# Patient Record
Sex: FEMALE | Race: WHITE | NOT HISPANIC OR LATINO | Employment: FULL TIME | URBAN - NONMETROPOLITAN AREA
[De-identification: names, ages, dates, MRNs, and addresses within clinical notes are randomized per-mention and may not be internally consistent; named-entity substitution may affect disease eponyms.]

---

## 2017-07-01 ENCOUNTER — HOSPITAL ENCOUNTER (EMERGENCY)
Facility: HOSPITAL | Age: 57
Discharge: HOME | End: 2017-07-01
Attending: EMERGENCY MEDICINE | Admitting: EMERGENCY MEDICINE

## 2017-07-01 ENCOUNTER — APPOINTMENT (OUTPATIENT)
Dept: CT IMAGING | Facility: HOSPITAL | Age: 57
End: 2017-07-01

## 2017-07-01 ENCOUNTER — APPOINTMENT (OUTPATIENT)
Dept: GENERAL RADIOLOGY | Facility: HOSPITAL | Age: 57
End: 2017-07-01

## 2017-07-01 VITALS
HEART RATE: 78 BPM | OXYGEN SATURATION: 100 % | DIASTOLIC BLOOD PRESSURE: 88 MMHG | HEIGHT: 61 IN | TEMPERATURE: 98.2 F | SYSTOLIC BLOOD PRESSURE: 152 MMHG | WEIGHT: 198 LBS | BODY MASS INDEX: 37.38 KG/M2 | RESPIRATION RATE: 16 BRPM

## 2017-07-01 DIAGNOSIS — R10.9 RIGHT FLANK PAIN: ICD-10-CM

## 2017-07-01 DIAGNOSIS — N39.0 ACUTE UTI (URINARY TRACT INFECTION): Primary | ICD-10-CM

## 2017-07-01 LAB
ALBUMIN SERPL-MCNC: 4.1 G/DL (ref 3.5–5)
ALBUMIN/GLOB SERPL: 1.3 G/DL (ref 1.5–2.5)
ALP SERPL-CCNC: 88 U/L (ref 35–104)
ALT SERPL W P-5'-P-CCNC: 14 U/L (ref 10–36)
AMYLASE SERPL-CCNC: 35 U/L (ref 28–100)
ANION GAP SERPL CALCULATED.3IONS-SCNC: 4.2 MMOL/L (ref 3.6–11.2)
AST SERPL-CCNC: 13 U/L (ref 10–30)
BACTERIA UR QL AUTO: ABNORMAL /HPF
BASOPHILS # BLD AUTO: 0.02 10*3/MM3 (ref 0–0.3)
BASOPHILS NFR BLD AUTO: 0.3 % (ref 0–2)
BILIRUB SERPL-MCNC: 0.4 MG/DL (ref 0.2–1.8)
BILIRUB UR QL STRIP: NEGATIVE
BUN BLD-MCNC: 16 MG/DL (ref 7–21)
BUN/CREAT SERPL: 21.3 (ref 7–25)
CALCIUM SPEC-SCNC: 9.6 MG/DL (ref 7.7–10)
CHLORIDE SERPL-SCNC: 104 MMOL/L (ref 99–112)
CLARITY UR: ABNORMAL
CO2 SERPL-SCNC: 28.8 MMOL/L (ref 24.3–31.9)
COLOR UR: YELLOW
CREAT BLD-MCNC: 0.75 MG/DL (ref 0.43–1.29)
CRP SERPL-MCNC: 1.07 MG/DL (ref 0–0.99)
D-LACTATE SERPL-SCNC: 1.6 MMOL/L (ref 0.5–2)
DEPRECATED RDW RBC AUTO: 39.3 FL (ref 37–54)
EOSINOPHIL # BLD AUTO: 0.16 10*3/MM3 (ref 0–0.7)
EOSINOPHIL NFR BLD AUTO: 2.3 % (ref 0–5)
ERYTHROCYTE [DISTWIDTH] IN BLOOD BY AUTOMATED COUNT: 14.1 % (ref 11.5–14.5)
ERYTHROCYTE [SEDIMENTATION RATE] IN BLOOD: 34 MM/HR (ref 0–30)
GFR SERPL CREATININE-BSD FRML MDRD: 80 ML/MIN/1.73
GLOBULIN UR ELPH-MCNC: 3.2 GM/DL
GLUCOSE BLD-MCNC: 254 MG/DL (ref 70–110)
GLUCOSE UR STRIP-MCNC: ABNORMAL MG/DL
HCT VFR BLD AUTO: 34.6 % (ref 37–47)
HGB BLD-MCNC: 11.5 G/DL (ref 12–16)
HGB UR QL STRIP.AUTO: NEGATIVE
HYALINE CASTS UR QL AUTO: ABNORMAL /LPF
IMM GRANULOCYTES # BLD: 0.01 10*3/MM3 (ref 0–0.03)
IMM GRANULOCYTES NFR BLD: 0.1 % (ref 0–0.5)
KETONES UR QL STRIP: NEGATIVE
LEUKOCYTE ESTERASE UR QL STRIP.AUTO: ABNORMAL
LIPASE SERPL-CCNC: 65 U/L (ref 13–60)
LYMPHOCYTES # BLD AUTO: 2.72 10*3/MM3 (ref 1–3)
LYMPHOCYTES NFR BLD AUTO: 39.8 % (ref 21–51)
MAGNESIUM SERPL-MCNC: 1.6 MG/DL (ref 1.7–2.6)
MCH RBC QN AUTO: 26.3 PG (ref 27–33)
MCHC RBC AUTO-ENTMCNC: 33.2 G/DL (ref 33–37)
MCV RBC AUTO: 79 FL (ref 80–94)
MONOCYTES # BLD AUTO: 0.65 10*3/MM3 (ref 0.1–0.9)
MONOCYTES NFR BLD AUTO: 9.5 % (ref 0–10)
NEUTROPHILS # BLD AUTO: 3.28 10*3/MM3 (ref 1.4–6.5)
NEUTROPHILS NFR BLD AUTO: 48 % (ref 30–70)
NITRITE UR QL STRIP: NEGATIVE
OSMOLALITY SERPL CALC.SUM OF ELEC: 283.6 MOSM/KG (ref 273–305)
PH UR STRIP.AUTO: <=5 [PH] (ref 5–8)
PHOSPHATE SERPL-MCNC: 3.7 MG/DL (ref 2.7–4.5)
PLATELET # BLD AUTO: 225 10*3/MM3 (ref 130–400)
PMV BLD AUTO: 8.6 FL (ref 6–10)
POTASSIUM BLD-SCNC: 4 MMOL/L (ref 3.5–5.3)
PROT SERPL-MCNC: 7.3 G/DL (ref 6–8)
PROT UR QL STRIP: NEGATIVE
RBC # BLD AUTO: 4.38 10*6/MM3 (ref 4.2–5.4)
RBC # UR: ABNORMAL /HPF
REF LAB TEST METHOD: ABNORMAL
SODIUM BLD-SCNC: 137 MMOL/L (ref 135–153)
SP GR UR STRIP: 1.02 (ref 1–1.03)
SQUAMOUS #/AREA URNS HPF: ABNORMAL /HPF
TROPONIN I SERPL-MCNC: <0.006 NG/ML
UROBILINOGEN UR QL STRIP: ABNORMAL
WBC NRBC COR # BLD: 6.84 10*3/MM3 (ref 4.5–12.5)
WBC UR QL AUTO: ABNORMAL /HPF

## 2017-07-01 PROCEDURE — 74176 CT ABD & PELVIS W/O CONTRAST: CPT | Performed by: RADIOLOGY

## 2017-07-01 PROCEDURE — 71010 HC CHEST PA OR AP: CPT

## 2017-07-01 PROCEDURE — 96375 TX/PRO/DX INJ NEW DRUG ADDON: CPT

## 2017-07-01 PROCEDURE — 85025 COMPLETE CBC W/AUTO DIFF WBC: CPT | Performed by: EMERGENCY MEDICINE

## 2017-07-01 PROCEDURE — 83690 ASSAY OF LIPASE: CPT | Performed by: EMERGENCY MEDICINE

## 2017-07-01 PROCEDURE — 87086 URINE CULTURE/COLONY COUNT: CPT | Performed by: EMERGENCY MEDICINE

## 2017-07-01 PROCEDURE — 82150 ASSAY OF AMYLASE: CPT | Performed by: EMERGENCY MEDICINE

## 2017-07-01 PROCEDURE — 86140 C-REACTIVE PROTEIN: CPT | Performed by: EMERGENCY MEDICINE

## 2017-07-01 PROCEDURE — 99283 EMERGENCY DEPT VISIT LOW MDM: CPT

## 2017-07-01 PROCEDURE — 74176 CT ABD & PELVIS W/O CONTRAST: CPT

## 2017-07-01 PROCEDURE — 85652 RBC SED RATE AUTOMATED: CPT | Performed by: EMERGENCY MEDICINE

## 2017-07-01 PROCEDURE — 84100 ASSAY OF PHOSPHORUS: CPT | Performed by: EMERGENCY MEDICINE

## 2017-07-01 PROCEDURE — 80053 COMPREHEN METABOLIC PANEL: CPT | Performed by: EMERGENCY MEDICINE

## 2017-07-01 PROCEDURE — 71010 XR CHEST 1 VW: CPT | Performed by: RADIOLOGY

## 2017-07-01 PROCEDURE — 83735 ASSAY OF MAGNESIUM: CPT | Performed by: EMERGENCY MEDICINE

## 2017-07-01 PROCEDURE — 25010000002 ONDANSETRON PER 1 MG: Performed by: EMERGENCY MEDICINE

## 2017-07-01 PROCEDURE — 93010 ELECTROCARDIOGRAM REPORT: CPT | Performed by: INTERNAL MEDICINE

## 2017-07-01 PROCEDURE — 96374 THER/PROPH/DIAG INJ IV PUSH: CPT

## 2017-07-01 PROCEDURE — 83605 ASSAY OF LACTIC ACID: CPT | Performed by: EMERGENCY MEDICINE

## 2017-07-01 PROCEDURE — 84484 ASSAY OF TROPONIN QUANT: CPT | Performed by: EMERGENCY MEDICINE

## 2017-07-01 PROCEDURE — 93005 ELECTROCARDIOGRAM TRACING: CPT | Performed by: EMERGENCY MEDICINE

## 2017-07-01 PROCEDURE — 81001 URINALYSIS AUTO W/SCOPE: CPT | Performed by: EMERGENCY MEDICINE

## 2017-07-01 PROCEDURE — 87040 BLOOD CULTURE FOR BACTERIA: CPT | Performed by: EMERGENCY MEDICINE

## 2017-07-01 PROCEDURE — 25010000002 KETOROLAC TROMETHAMINE PER 15 MG: Performed by: EMERGENCY MEDICINE

## 2017-07-01 RX ORDER — SODIUM CHLORIDE 0.9 % (FLUSH) 0.9 %
10 SYRINGE (ML) INJECTION AS NEEDED
Status: DISCONTINUED | OUTPATIENT
Start: 2017-07-01 | End: 2017-07-01 | Stop reason: HOSPADM

## 2017-07-01 RX ORDER — KETOROLAC TROMETHAMINE 30 MG/ML
30 INJECTION, SOLUTION INTRAMUSCULAR; INTRAVENOUS ONCE
Status: COMPLETED | OUTPATIENT
Start: 2017-07-01 | End: 2017-07-01

## 2017-07-01 RX ORDER — NITROFURANTOIN 25; 75 MG/1; MG/1
100 CAPSULE ORAL EVERY 12 HOURS SCHEDULED
Status: DISCONTINUED | OUTPATIENT
Start: 2017-07-01 | End: 2017-07-01 | Stop reason: HOSPADM

## 2017-07-01 RX ORDER — ONDANSETRON 2 MG/ML
4 INJECTION INTRAMUSCULAR; INTRAVENOUS ONCE
Status: COMPLETED | OUTPATIENT
Start: 2017-07-01 | End: 2017-07-01

## 2017-07-01 RX ORDER — NITROFURANTOIN 25; 75 MG/1; MG/1
100 CAPSULE ORAL 2 TIMES DAILY
Qty: 20 CAPSULE | Refills: 0 | OUTPATIENT
Start: 2017-07-01 | End: 2018-12-25

## 2017-07-01 RX ADMIN — KETOROLAC TROMETHAMINE 30 MG: 30 INJECTION, SOLUTION INTRAMUSCULAR at 08:23

## 2017-07-01 RX ADMIN — ONDANSETRON 4 MG: 2 INJECTION, SOLUTION INTRAMUSCULAR; INTRAVENOUS at 08:20

## 2017-07-01 RX ADMIN — NITROFURANTOIN MONOHYDRATE/MACROCRYSTALLINE 100 MG: 25; 75 CAPSULE ORAL at 10:49

## 2017-07-01 NOTE — DISCHARGE INSTRUCTIONS

## 2017-07-02 NOTE — ED PROVIDER NOTES
Subjective   Patient is a 56 y.o. female presenting with flank pain.   Flank Pain   Pain location:  R flank  Pain quality: aching and sharp    Pain radiates to:  R flank  Pain severity:  Mild  Onset quality:  Gradual  Timing:  Constant  Progression:  Worsening  Chronicity:  New  Context: not alcohol use, not awakening from sleep, not diet changes, not eating, not laxative use, not medication withdrawal, not previous surgeries, not recent illness, not recent sexual activity, not recent travel, not retching, not sick contacts, not suspicious food intake and not trauma    Relieved by:  Nothing  Worsened by:  Nothing  Ineffective treatments:  None tried  Associated symptoms: no chest pain, no chills, no cough, no diarrhea, no dysuria, no fatigue, no fever, no nausea, no shortness of breath, no sore throat and no vomiting    Risk factors: obesity        Review of Systems   Constitutional: Negative for activity change, appetite change, chills, diaphoresis, fatigue and fever.   HENT: Negative for congestion, ear pain and sore throat.    Eyes: Negative for redness.   Respiratory: Negative for cough, chest tightness, shortness of breath and wheezing.    Cardiovascular: Negative for chest pain, palpitations and leg swelling.   Gastrointestinal: Positive for abdominal pain. Negative for diarrhea, nausea and vomiting.   Genitourinary: Positive for flank pain. Negative for dysuria and urgency.   Musculoskeletal: Negative for arthralgias, back pain, myalgias and neck pain.   Skin: Negative for pallor, rash and wound.   Neurological: Negative for dizziness, speech difficulty, weakness and headaches.   Psychiatric/Behavioral: Negative for agitation, behavioral problems, confusion and decreased concentration.       Past Medical History:   Diagnosis Date   • Anemia    • Diabetes mellitus    • Fatigue    • Gallbladder disease    • High cholesterol    • Hypertension    • Migraine    • Sinusitis     CURRENTLY       Allergies   Allergen  Reactions   • Latex Rash       Past Surgical History:   Procedure Laterality Date   • ABDOMINAL SURGERY     •  SECTION      times 3   • CHOLECYSTECTOMY N/A 2016    Procedure: CHOLECYSTECTOMY LAPAROSCOPIC;  Surgeon: Anil Resendez MD;  Location: [facility] OR;  Service:    • COLONOSCOPY     • COLONOSCOPY N/A 9/15/2016    Procedure: COLONOSCOPY;  Surgeon: Anil Resendez MD;  Location: [facility] OR;  Service:    • TUBAL ABDOMINAL LIGATION         Family History   Problem Relation Age of Onset   • Heart disease Mother    • Cancer Father    • Cancer Sister    • Diabetes Sister    • Hypertension Sister    • Diabetes Brother    • Heart disease Brother    • Hypertension Brother    • Cancer Paternal Aunt    • Cancer Paternal Uncle    • Diabetes Maternal Grandmother    • Breast cancer Maternal Aunt        Social History     Social History   • Marital status:      Spouse name: N/A   • Number of children: N/A   • Years of education: N/A     Social History Main Topics   • Smoking status: Never Smoker   • Smokeless tobacco: None   • Alcohol use No   • Drug use: No   • Sexual activity: Not Asked     Other Topics Concern   • None     Social History Narrative           Objective   Physical Exam   Constitutional: She is oriented to person, place, and time. She appears well-developed and well-nourished.  Non-toxic appearance. No distress.   HENT:   Head: Normocephalic and atraumatic.   Right Ear: External ear normal.   Left Ear: External ear normal.   Nose: Nose normal.   Mouth/Throat: Oropharynx is clear and moist and mucous membranes are normal. No oropharyngeal exudate. No tonsillar exudate.   Eyes: Conjunctivae, EOM and lids are normal. Pupils are equal, round, and reactive to light.   Neck: Normal range of motion and full passive range of motion without pain. Neck supple. No thyromegaly present.   Cardiovascular: Normal rate, regular rhythm, S1 normal, S2 normal, normal heart sounds, intact distal pulses and normal  pulses.    Pulmonary/Chest: Effort normal and breath sounds normal. No tachypnea. No respiratory distress. She has no decreased breath sounds. She has no wheezes. She has no rales. She exhibits no tenderness.   Abdominal: Soft. Normal appearance and bowel sounds are normal. She exhibits no distension. There is tenderness. There is CVA tenderness. There is no rebound and no guarding.   Musculoskeletal: Normal range of motion. She exhibits no edema, tenderness or deformity.   Lymphadenopathy:     She has no cervical adenopathy.   Neurological: She is alert and oriented to person, place, and time. She has normal strength. No cranial nerve deficit or sensory deficit. GCS eye subscore is 4. GCS verbal subscore is 5. GCS motor subscore is 6.   Skin: Skin is warm, dry and intact. No rash noted. She is not diaphoretic. No erythema. No pallor.   Psychiatric: She has a normal mood and affect. Her speech is normal and behavior is normal. Judgment and thought content normal. Cognition and memory are normal.   Nursing note and vitals reviewed.      Procedures         ED Course  ED Course   Value Comment By Time   CT Abdomen Pelvis Stone Protocol IMPRESSION:  : Mild thickening of the distal esophageal wall. I would consider direct  visualization  Moderate to large volume stool in the colon  Other findings as above Bacilio De La Rosa MD 07/01 0941   XR Chest 1 View IMPRESSION:  No evidence of active or acute cardiopulmonary disease on today's chest  radiograph. Bacilio De La Rosa MD 07/01 2767   ECG 12 Lead Normal Sinus Rhythm.  No Acute Ischemia. Bacilio De La Rosa MD 07/02 1632                  MDM  Number of Diagnoses or Management Options  Acute UTI (urinary tract infection): new and requires workup  Right flank pain: new and requires workup     Amount and/or Complexity of Data Reviewed  Clinical lab tests: ordered and reviewed  Tests in the radiology section of CPT®: ordered and reviewed  Tests in the  medicine section of CPT®: ordered and reviewed  Independent visualization of images, tracings, or specimens: yes    Risk of Complications, Morbidity, and/or Mortality  Presenting problems: moderate  Diagnostic procedures: moderate  Management options: moderate    Patient Progress  Patient progress: stable      Final diagnoses:   Acute UTI (urinary tract infection)   Right flank pain            Bacilio De La Rosa MD  07/02/17 6503

## 2017-07-03 LAB — BACTERIA SPEC AEROBE CULT: NORMAL

## 2017-07-06 LAB
BACTERIA SPEC AEROBE CULT: NORMAL
BACTERIA SPEC AEROBE CULT: NORMAL

## 2018-05-24 ENCOUNTER — HOSPITAL ENCOUNTER (OUTPATIENT)
Dept: MAMMOGRAPHY | Facility: HOSPITAL | Age: 58
Discharge: HOME | End: 2018-05-24
Admitting: NURSE PRACTITIONER

## 2018-05-24 ENCOUNTER — HOSPITAL ENCOUNTER (OUTPATIENT)
Dept: BONE DENSITY | Facility: HOSPITAL | Age: 58
Discharge: HOME | End: 2018-05-24

## 2018-05-24 DIAGNOSIS — Z12.39 BREAST CANCER SCREENING: ICD-10-CM

## 2018-05-24 DIAGNOSIS — Z78.0 POST-MENOPAUSAL: ICD-10-CM

## 2018-05-24 PROCEDURE — 77067 SCR MAMMO BI INCL CAD: CPT

## 2018-05-24 PROCEDURE — 77063 BREAST TOMOSYNTHESIS BI: CPT

## 2018-05-24 PROCEDURE — 77067 SCR MAMMO BI INCL CAD: CPT | Performed by: RADIOLOGY

## 2018-05-24 PROCEDURE — 77080 DXA BONE DENSITY AXIAL: CPT

## 2018-05-24 PROCEDURE — 77063 BREAST TOMOSYNTHESIS BI: CPT | Performed by: RADIOLOGY

## 2018-05-24 PROCEDURE — 77080 DXA BONE DENSITY AXIAL: CPT | Performed by: RADIOLOGY

## 2018-12-25 ENCOUNTER — APPOINTMENT (OUTPATIENT)
Dept: GENERAL RADIOLOGY | Facility: HOSPITAL | Age: 58
End: 2018-12-25

## 2018-12-25 ENCOUNTER — APPOINTMENT (OUTPATIENT)
Dept: CT IMAGING | Facility: HOSPITAL | Age: 58
End: 2018-12-25

## 2018-12-25 ENCOUNTER — HOSPITAL ENCOUNTER (EMERGENCY)
Facility: HOSPITAL | Age: 58
Discharge: HOME | End: 2018-12-25
Attending: FAMILY MEDICINE | Admitting: FAMILY MEDICINE

## 2018-12-25 DIAGNOSIS — R10.9 ACUTE RIGHT FLANK PAIN: Primary | ICD-10-CM

## 2018-12-25 DIAGNOSIS — Z79.4 TYPE 2 DIABETES MELLITUS WITH HYPERGLYCEMIA, WITH LONG-TERM CURRENT USE OF INSULIN (HCC): ICD-10-CM

## 2018-12-25 DIAGNOSIS — E11.65 TYPE 2 DIABETES MELLITUS WITH HYPERGLYCEMIA, WITH LONG-TERM CURRENT USE OF INSULIN (HCC): ICD-10-CM

## 2018-12-25 LAB
ALBUMIN SERPL-MCNC: 4.3 G/DL (ref 3.5–5)
ALBUMIN/GLOB SERPL: 1.3 G/DL (ref 1.5–2.5)
ALP SERPL-CCNC: 80 U/L (ref 35–104)
ALT SERPL W P-5'-P-CCNC: 32 U/L (ref 10–36)
ANION GAP SERPL CALCULATED.3IONS-SCNC: 9 MMOL/L (ref 3.6–11.2)
AST SERPL-CCNC: 32 U/L (ref 10–30)
BASOPHILS # BLD AUTO: 0.01 10*3/MM3 (ref 0–0.3)
BASOPHILS NFR BLD AUTO: 0.2 % (ref 0–2)
BILIRUB SERPL-MCNC: 0.3 MG/DL (ref 0.2–1.8)
BILIRUB UR QL STRIP: NEGATIVE
BUN BLD-MCNC: 19 MG/DL (ref 7–21)
BUN/CREAT SERPL: 17.8 (ref 7–25)
CALCIUM SPEC-SCNC: 9.2 MG/DL (ref 7.7–10)
CHLORIDE SERPL-SCNC: 103 MMOL/L (ref 99–112)
CLARITY UR: CLEAR
CO2 SERPL-SCNC: 25 MMOL/L (ref 24.3–31.9)
COLOR UR: YELLOW
CREAT BLD-MCNC: 1.07 MG/DL (ref 0.43–1.29)
DEPRECATED RDW RBC AUTO: 38.8 FL (ref 37–54)
EOSINOPHIL # BLD AUTO: 0.09 10*3/MM3 (ref 0–0.7)
EOSINOPHIL NFR BLD AUTO: 1.7 % (ref 0–5)
ERYTHROCYTE [DISTWIDTH] IN BLOOD BY AUTOMATED COUNT: 13.8 % (ref 11.5–14.5)
GFR SERPL CREATININE-BSD FRML MDRD: 53 ML/MIN/1.73
GLOBULIN UR ELPH-MCNC: 3.2 GM/DL
GLUCOSE BLD-MCNC: 495 MG/DL (ref 70–110)
GLUCOSE UR STRIP-MCNC: ABNORMAL MG/DL
HCT VFR BLD AUTO: 33.8 % (ref 37–47)
HGB BLD-MCNC: 11.1 G/DL (ref 12–16)
HGB UR QL STRIP.AUTO: NEGATIVE
IMM GRANULOCYTES # BLD AUTO: 0.01 10*3/MM3 (ref 0–0.03)
IMM GRANULOCYTES NFR BLD AUTO: 0.2 % (ref 0–0.5)
KETONES UR QL STRIP: NEGATIVE
LEUKOCYTE ESTERASE UR QL STRIP.AUTO: NEGATIVE
LIPASE SERPL-CCNC: 106 U/L (ref 13–60)
LYMPHOCYTES # BLD AUTO: 1.91 10*3/MM3 (ref 1–3)
LYMPHOCYTES NFR BLD AUTO: 35 % (ref 21–51)
MCH RBC QN AUTO: 26.4 PG (ref 27–33)
MCHC RBC AUTO-ENTMCNC: 32.8 G/DL (ref 33–37)
MCV RBC AUTO: 80.3 FL (ref 80–94)
MONOCYTES # BLD AUTO: 0.41 10*3/MM3 (ref 0.1–0.9)
MONOCYTES NFR BLD AUTO: 7.5 % (ref 0–10)
NEUTROPHILS # BLD AUTO: 3.02 10*3/MM3 (ref 1.4–6.5)
NEUTROPHILS NFR BLD AUTO: 55.4 % (ref 30–70)
NITRITE UR QL STRIP: NEGATIVE
OSMOLALITY SERPL CALC.SUM OF ELEC: 298.1 MOSM/KG (ref 273–305)
PH UR STRIP.AUTO: 7.5 [PH] (ref 5–8)
PLATELET # BLD AUTO: 244 10*3/MM3 (ref 130–400)
PMV BLD AUTO: 8.4 FL (ref 6–10)
POTASSIUM BLD-SCNC: 4.1 MMOL/L (ref 3.5–5.3)
PROT SERPL-MCNC: 7.5 G/DL (ref 6–8)
PROT UR QL STRIP: NEGATIVE
RBC # BLD AUTO: 4.21 10*6/MM3 (ref 4.2–5.4)
SODIUM BLD-SCNC: 137 MMOL/L (ref 135–153)
SP GR UR STRIP: 1.03 (ref 1–1.03)
UROBILINOGEN UR QL STRIP: ABNORMAL
WBC NRBC COR # BLD: 5.45 10*3/MM3 (ref 4.5–12.5)

## 2018-12-25 PROCEDURE — 25010000002 KETOROLAC TROMETHAMINE PER 15 MG: Performed by: FAMILY MEDICINE

## 2018-12-25 PROCEDURE — 63710000001 INSULIN REGULAR HUMAN PER 5 UNITS: Performed by: FAMILY MEDICINE

## 2018-12-25 PROCEDURE — 71045 X-RAY EXAM CHEST 1 VIEW: CPT | Performed by: RADIOLOGY

## 2018-12-25 PROCEDURE — 99285 EMERGENCY DEPT VISIT HI MDM: CPT

## 2018-12-25 PROCEDURE — 96372 THER/PROPH/DIAG INJ SC/IM: CPT

## 2018-12-25 PROCEDURE — 80053 COMPREHEN METABOLIC PANEL: CPT | Performed by: FAMILY MEDICINE

## 2018-12-25 PROCEDURE — 83690 ASSAY OF LIPASE: CPT | Performed by: FAMILY MEDICINE

## 2018-12-25 PROCEDURE — 74176 CT ABD & PELVIS W/O CONTRAST: CPT | Performed by: RADIOLOGY

## 2018-12-25 PROCEDURE — 71045 X-RAY EXAM CHEST 1 VIEW: CPT

## 2018-12-25 PROCEDURE — 74176 CT ABD & PELVIS W/O CONTRAST: CPT

## 2018-12-25 PROCEDURE — 81003 URINALYSIS AUTO W/O SCOPE: CPT | Performed by: FAMILY MEDICINE

## 2018-12-25 PROCEDURE — 85025 COMPLETE CBC W/AUTO DIFF WBC: CPT | Performed by: FAMILY MEDICINE

## 2018-12-25 RX ORDER — KETOROLAC TROMETHAMINE 30 MG/ML
30 INJECTION, SOLUTION INTRAMUSCULAR; INTRAVENOUS ONCE
Status: COMPLETED | OUTPATIENT
Start: 2018-12-25 | End: 2018-12-25

## 2018-12-25 RX ORDER — ORPHENADRINE CITRATE 100 MG/1
100 TABLET, EXTENDED RELEASE ORAL 2 TIMES DAILY PRN
Qty: 20 TABLET | Refills: 0 | OUTPATIENT
Start: 2018-12-25 | End: 2019-07-22

## 2018-12-25 RX ORDER — KETOROLAC TROMETHAMINE 10 MG/1
10 TABLET, FILM COATED ORAL EVERY 6 HOURS PRN
Qty: 20 TABLET | Refills: 0 | OUTPATIENT
Start: 2018-12-25 | End: 2019-07-22

## 2018-12-25 RX ADMIN — KETOROLAC TROMETHAMINE 30 MG: 30 INJECTION, SOLUTION INTRAMUSCULAR; INTRAVENOUS at 22:32

## 2018-12-25 RX ADMIN — HUMAN INSULIN 8 UNITS: 100 INJECTION, SOLUTION SUBCUTANEOUS at 22:18

## 2018-12-26 VITALS
HEIGHT: 61 IN | RESPIRATION RATE: 18 BRPM | TEMPERATURE: 98 F | BODY MASS INDEX: 37.38 KG/M2 | HEART RATE: 80 BPM | WEIGHT: 198 LBS | DIASTOLIC BLOOD PRESSURE: 86 MMHG | OXYGEN SATURATION: 98 % | SYSTOLIC BLOOD PRESSURE: 130 MMHG

## 2019-07-22 ENCOUNTER — APPOINTMENT (OUTPATIENT)
Dept: CT IMAGING | Facility: HOSPITAL | Age: 59
End: 2019-07-22

## 2019-07-22 ENCOUNTER — HOSPITAL ENCOUNTER (EMERGENCY)
Facility: HOSPITAL | Age: 59
Discharge: HOME | End: 2019-07-22
Attending: EMERGENCY MEDICINE | Admitting: EMERGENCY MEDICINE

## 2019-07-22 VITALS
SYSTOLIC BLOOD PRESSURE: 114 MMHG | DIASTOLIC BLOOD PRESSURE: 86 MMHG | OXYGEN SATURATION: 96 % | BODY MASS INDEX: 30.82 KG/M2 | TEMPERATURE: 98.6 F | RESPIRATION RATE: 20 BRPM | HEART RATE: 82 BPM | HEIGHT: 65 IN | WEIGHT: 185 LBS

## 2019-07-22 DIAGNOSIS — R19.7 DIARRHEA, UNSPECIFIED TYPE: ICD-10-CM

## 2019-07-22 DIAGNOSIS — R10.10 PAIN OF UPPER ABDOMEN: Primary | ICD-10-CM

## 2019-07-22 DIAGNOSIS — E83.42 HYPOMAGNESEMIA: ICD-10-CM

## 2019-07-22 LAB
ALBUMIN SERPL-MCNC: 3.81 G/DL (ref 3.5–5.2)
ALBUMIN/GLOB SERPL: 1.2 G/DL
ALP SERPL-CCNC: 109 U/L (ref 39–117)
ALT SERPL W P-5'-P-CCNC: 11 U/L (ref 1–33)
AMYLASE SERPL-CCNC: 40 U/L (ref 28–100)
ANION GAP SERPL CALCULATED.3IONS-SCNC: 15.5 MMOL/L (ref 5–15)
AST SERPL-CCNC: 13 U/L (ref 1–32)
BASOPHILS # BLD AUTO: 0.01 10*3/MM3 (ref 0–0.2)
BASOPHILS NFR BLD AUTO: 0.2 % (ref 0–1.5)
BILIRUB SERPL-MCNC: 0.6 MG/DL (ref 0.2–1.2)
BILIRUB UR QL STRIP: NEGATIVE
BUN BLD-MCNC: 17 MG/DL (ref 6–20)
BUN/CREAT SERPL: 20 (ref 7–25)
CALCIUM SPEC-SCNC: 9.2 MG/DL (ref 8.6–10.5)
CHLORIDE SERPL-SCNC: 99 MMOL/L (ref 98–107)
CLARITY UR: CLEAR
CO2 SERPL-SCNC: 22.5 MMOL/L (ref 22–29)
COLOR UR: YELLOW
CREAT BLD-MCNC: 0.85 MG/DL (ref 0.57–1)
CRP SERPL-MCNC: 0.67 MG/DL (ref 0–0.5)
DEPRECATED RDW RBC AUTO: 39.6 FL (ref 37–54)
EOSINOPHIL # BLD AUTO: 0.1 10*3/MM3 (ref 0–0.4)
EOSINOPHIL NFR BLD AUTO: 1.5 % (ref 0.3–6.2)
ERYTHROCYTE [DISTWIDTH] IN BLOOD BY AUTOMATED COUNT: 14.2 % (ref 12.3–15.4)
GFR SERPL CREATININE-BSD FRML MDRD: 69 ML/MIN/1.73
GLOBULIN UR ELPH-MCNC: 3.2 GM/DL
GLUCOSE BLD-MCNC: 320 MG/DL (ref 65–99)
GLUCOSE UR STRIP-MCNC: ABNORMAL MG/DL
HCT VFR BLD AUTO: 37.9 % (ref 34–46.6)
HGB BLD-MCNC: 12.3 G/DL (ref 12–15.9)
HGB UR QL STRIP.AUTO: NEGATIVE
IMM GRANULOCYTES # BLD AUTO: 0.01 10*3/MM3 (ref 0–0.05)
IMM GRANULOCYTES NFR BLD AUTO: 0.2 % (ref 0–0.5)
KETONES UR QL STRIP: NEGATIVE
LEUKOCYTE ESTERASE UR QL STRIP.AUTO: NEGATIVE
LIPASE SERPL-CCNC: 83 U/L (ref 13–60)
LYMPHOCYTES # BLD AUTO: 1.8 10*3/MM3 (ref 0.7–3.1)
LYMPHOCYTES NFR BLD AUTO: 27.7 % (ref 19.6–45.3)
MAGNESIUM SERPL-MCNC: 1.2 MG/DL (ref 1.6–2.6)
MCH RBC QN AUTO: 25.6 PG (ref 26.6–33)
MCHC RBC AUTO-ENTMCNC: 32.5 G/DL (ref 31.5–35.7)
MCV RBC AUTO: 79 FL (ref 79–97)
MONOCYTES # BLD AUTO: 0.45 10*3/MM3 (ref 0.1–0.9)
MONOCYTES NFR BLD AUTO: 6.9 % (ref 5–12)
NEUTROPHILS # BLD AUTO: 4.13 10*3/MM3 (ref 1.7–7)
NEUTROPHILS NFR BLD AUTO: 63.5 % (ref 42.7–76)
NITRITE UR QL STRIP: NEGATIVE
PH UR STRIP.AUTO: <=5 [PH] (ref 5–8)
PLATELET # BLD AUTO: 216 10*3/MM3 (ref 140–450)
PMV BLD AUTO: 8.7 FL (ref 6–12)
POTASSIUM BLD-SCNC: 4 MMOL/L (ref 3.5–5.2)
PROT SERPL-MCNC: 7 G/DL (ref 6–8.5)
PROT UR QL STRIP: NEGATIVE
RBC # BLD AUTO: 4.8 10*6/MM3 (ref 3.77–5.28)
SODIUM BLD-SCNC: 137 MMOL/L (ref 136–145)
SP GR UR STRIP: 1.02 (ref 1–1.03)
TROPONIN T SERPL-MCNC: <0.01 NG/ML (ref 0–0.03)
UROBILINOGEN UR QL STRIP: ABNORMAL
WBC NRBC COR # BLD: 6.5 10*3/MM3 (ref 3.4–10.8)

## 2019-07-22 PROCEDURE — 96365 THER/PROPH/DIAG IV INF INIT: CPT

## 2019-07-22 PROCEDURE — 96366 THER/PROPH/DIAG IV INF ADDON: CPT

## 2019-07-22 PROCEDURE — 81003 URINALYSIS AUTO W/O SCOPE: CPT | Performed by: PHYSICIAN ASSISTANT

## 2019-07-22 PROCEDURE — 74177 CT ABD & PELVIS W/CONTRAST: CPT | Performed by: RADIOLOGY

## 2019-07-22 PROCEDURE — 0 IOVERSOL 74 % SOLUTION: Performed by: EMERGENCY MEDICINE

## 2019-07-22 PROCEDURE — 84484 ASSAY OF TROPONIN QUANT: CPT | Performed by: PHYSICIAN ASSISTANT

## 2019-07-22 PROCEDURE — 74177 CT ABD & PELVIS W/CONTRAST: CPT

## 2019-07-22 PROCEDURE — 25010000002 MAGNESIUM SULFATE 2 GM/50ML SOLUTION: Performed by: PHYSICIAN ASSISTANT

## 2019-07-22 PROCEDURE — 83690 ASSAY OF LIPASE: CPT | Performed by: PHYSICIAN ASSISTANT

## 2019-07-22 PROCEDURE — 82150 ASSAY OF AMYLASE: CPT | Performed by: PHYSICIAN ASSISTANT

## 2019-07-22 PROCEDURE — 93005 ELECTROCARDIOGRAM TRACING: CPT | Performed by: PHYSICIAN ASSISTANT

## 2019-07-22 PROCEDURE — 80053 COMPREHEN METABOLIC PANEL: CPT | Performed by: PHYSICIAN ASSISTANT

## 2019-07-22 PROCEDURE — 99284 EMERGENCY DEPT VISIT MOD MDM: CPT

## 2019-07-22 PROCEDURE — 85025 COMPLETE CBC W/AUTO DIFF WBC: CPT | Performed by: PHYSICIAN ASSISTANT

## 2019-07-22 PROCEDURE — 93010 ELECTROCARDIOGRAM REPORT: CPT | Performed by: INTERNAL MEDICINE

## 2019-07-22 PROCEDURE — 83735 ASSAY OF MAGNESIUM: CPT | Performed by: PHYSICIAN ASSISTANT

## 2019-07-22 PROCEDURE — 86140 C-REACTIVE PROTEIN: CPT | Performed by: PHYSICIAN ASSISTANT

## 2019-07-22 RX ORDER — MAGNESIUM SULFATE HEPTAHYDRATE 40 MG/ML
2 INJECTION, SOLUTION INTRAVENOUS ONCE
Status: COMPLETED | OUTPATIENT
Start: 2019-07-22 | End: 2019-07-22

## 2019-07-22 RX ORDER — SODIUM CHLORIDE 0.9 % (FLUSH) 0.9 %
10 SYRINGE (ML) INJECTION AS NEEDED
Status: DISCONTINUED | OUTPATIENT
Start: 2019-07-22 | End: 2019-07-22 | Stop reason: HOSPADM

## 2019-07-22 RX ORDER — PANTOPRAZOLE SODIUM 40 MG/1
40 TABLET, DELAYED RELEASE ORAL DAILY
Qty: 30 TABLET | Refills: 0 | Status: SHIPPED | OUTPATIENT
Start: 2019-07-22

## 2019-07-22 RX ORDER — ONDANSETRON 4 MG/1
4 TABLET, ORALLY DISINTEGRATING ORAL EVERY 6 HOURS PRN
Qty: 10 TABLET | Refills: 0 | Status: SHIPPED | OUTPATIENT
Start: 2019-07-22

## 2019-07-22 RX ADMIN — MAGNESIUM SULFATE IN WATER 2 G: 40 INJECTION, SOLUTION INTRAVENOUS at 13:05

## 2019-07-22 RX ADMIN — IOVERSOL 100 ML: 741 INJECTION INTRA-ARTERIAL; INTRAVENOUS at 13:50

## 2019-07-22 RX ADMIN — SODIUM CHLORIDE 1000 ML: 9 INJECTION, SOLUTION INTRAVENOUS at 11:42

## 2019-08-19 ENCOUNTER — HOSPITAL ENCOUNTER (OUTPATIENT)
Dept: MAMMOGRAPHY | Facility: HOSPITAL | Age: 59
Discharge: HOME | End: 2019-08-19
Admitting: NURSE PRACTITIONER

## 2019-08-19 DIAGNOSIS — Z12.39 SCREENING BREAST EXAMINATION: ICD-10-CM

## 2019-08-19 PROCEDURE — 77063 BREAST TOMOSYNTHESIS BI: CPT

## 2019-08-19 PROCEDURE — 77067 SCR MAMMO BI INCL CAD: CPT

## 2019-08-19 PROCEDURE — 77063 BREAST TOMOSYNTHESIS BI: CPT | Performed by: RADIOLOGY

## 2019-08-19 PROCEDURE — 77067 SCR MAMMO BI INCL CAD: CPT | Performed by: RADIOLOGY

## 2020-01-01 ENCOUNTER — HOSPITAL ENCOUNTER (EMERGENCY)
Facility: HOSPITAL | Age: 60
Discharge: HOME | End: 2020-01-01
Attending: FAMILY MEDICINE | Admitting: FAMILY MEDICINE

## 2020-01-01 ENCOUNTER — APPOINTMENT (OUTPATIENT)
Dept: GENERAL RADIOLOGY | Facility: HOSPITAL | Age: 60
End: 2020-01-01

## 2020-01-01 VITALS
BODY MASS INDEX: 35.68 KG/M2 | DIASTOLIC BLOOD PRESSURE: 79 MMHG | HEART RATE: 67 BPM | OXYGEN SATURATION: 98 % | RESPIRATION RATE: 18 BRPM | HEIGHT: 61 IN | WEIGHT: 189 LBS | TEMPERATURE: 98 F | SYSTOLIC BLOOD PRESSURE: 176 MMHG

## 2020-01-01 DIAGNOSIS — M62.838 MUSCLE SPASM: ICD-10-CM

## 2020-01-01 DIAGNOSIS — M79.2 RADICULAR PAIN IN LEFT ARM: Primary | ICD-10-CM

## 2020-01-01 PROCEDURE — 73030 X-RAY EXAM OF SHOULDER: CPT

## 2020-01-01 PROCEDURE — 25010000002 METHYLPREDNISOLONE PER 80 MG: Performed by: PHYSICIAN ASSISTANT

## 2020-01-01 PROCEDURE — 96372 THER/PROPH/DIAG INJ SC/IM: CPT

## 2020-01-01 PROCEDURE — 25010000002 ORPHENADRINE CITRATE PER 60 MG: Performed by: PHYSICIAN ASSISTANT

## 2020-01-01 PROCEDURE — 99282 EMERGENCY DEPT VISIT SF MDM: CPT

## 2020-01-01 RX ORDER — IBUPROFEN 800 MG/1
800 TABLET ORAL EVERY 6 HOURS PRN
Qty: 30 TABLET | Refills: 0 | Status: SHIPPED | OUTPATIENT
Start: 2020-01-01

## 2020-01-01 RX ORDER — ORPHENADRINE CITRATE 30 MG/ML
60 INJECTION INTRAMUSCULAR; INTRAVENOUS ONCE
Status: COMPLETED | OUTPATIENT
Start: 2020-01-01 | End: 2020-01-01

## 2020-01-01 RX ORDER — METAXALONE 800 MG/1
800 TABLET ORAL 3 TIMES DAILY PRN
Qty: 9 TABLET | Refills: 0 | Status: SHIPPED | OUTPATIENT
Start: 2020-01-01

## 2020-01-01 RX ORDER — METHYLPREDNISOLONE ACETATE 80 MG/ML
120 INJECTION, SUSPENSION INTRA-ARTICULAR; INTRALESIONAL; INTRAMUSCULAR; SOFT TISSUE ONCE
Status: COMPLETED | OUTPATIENT
Start: 2020-01-01 | End: 2020-01-01

## 2020-01-01 RX ADMIN — ORPHENADRINE CITRATE 60 MG: 30 INJECTION INTRAMUSCULAR; INTRAVENOUS at 11:10

## 2020-01-01 RX ADMIN — METHYLPREDNISOLONE ACETATE 120 MG: 80 INJECTION, SUSPENSION INTRA-ARTICULAR; INTRALESIONAL; INTRAMUSCULAR; SOFT TISSUE at 11:11

## 2020-07-24 ENCOUNTER — HOSPITAL ENCOUNTER (OUTPATIENT)
Dept: BONE DENSITY | Facility: HOSPITAL | Age: 60
Discharge: HOME | End: 2020-07-24
Admitting: NURSE PRACTITIONER

## 2020-07-24 DIAGNOSIS — Z78.0 POSTMENOPAUSAL STATUS: ICD-10-CM

## 2020-07-24 PROCEDURE — 77080 DXA BONE DENSITY AXIAL: CPT | Performed by: RADIOLOGY

## 2020-07-24 PROCEDURE — 77080 DXA BONE DENSITY AXIAL: CPT

## 2020-08-24 ENCOUNTER — OFFICE VISIT (OUTPATIENT)
Dept: CARDIOLOGY | Facility: CLINIC | Age: 60
End: 2020-08-24

## 2020-08-24 ENCOUNTER — APPOINTMENT (OUTPATIENT)
Dept: BONE DENSITY | Facility: HOSPITAL | Age: 60
End: 2020-08-24

## 2020-08-24 VITALS
BODY MASS INDEX: 35.72 KG/M2 | DIASTOLIC BLOOD PRESSURE: 69 MMHG | HEIGHT: 61 IN | RESPIRATION RATE: 16 BRPM | TEMPERATURE: 97.5 F | HEART RATE: 63 BPM | WEIGHT: 189.2 LBS | SYSTOLIC BLOOD PRESSURE: 116 MMHG

## 2020-08-24 DIAGNOSIS — I10 ESSENTIAL HYPERTENSION: ICD-10-CM

## 2020-08-24 DIAGNOSIS — R06.02 SHORTNESS OF BREATH: ICD-10-CM

## 2020-08-24 DIAGNOSIS — E78.2 MIXED HYPERLIPIDEMIA: ICD-10-CM

## 2020-08-24 DIAGNOSIS — E11.65 UNCONTROLLED TYPE 2 DIABETES MELLITUS WITH HYPERGLYCEMIA (HCC): ICD-10-CM

## 2020-08-24 DIAGNOSIS — R07.2 PRECORDIAL PAIN: Primary | ICD-10-CM

## 2020-08-24 PROCEDURE — 99203 OFFICE O/P NEW LOW 30 MIN: CPT | Performed by: SPECIALIST

## 2020-08-24 RX ORDER — LOSARTAN POTASSIUM 50 MG/1
100 TABLET ORAL DAILY
COMMUNITY

## 2020-08-24 RX ORDER — OMEPRAZOLE 20 MG/1
20 CAPSULE, DELAYED RELEASE ORAL DAILY
COMMUNITY

## 2020-08-24 RX ORDER — FENOFIBRATE 134 MG/1
134 CAPSULE ORAL
COMMUNITY

## 2020-08-24 NOTE — PROGRESS NOTES
Subjective     Santa Valdovinos is a 59 y.o. female who presents to day for No chief complaint on file..    CHIEF COMPLIANT  No chief complaint on file.      Active Problems:  Problem List Items Addressed This Visit     None          HPI  HPI    PRIOR MEDS  Current Outpatient Medications on File Prior to Visit   Medication Sig Dispense Refill   • atorvastatin (LIPITOR) 20 MG tablet Take 40 mg by mouth Daily.     • cholecalciferol (VITAMIN D3) 1.25 MG (55832 UT) capsule Take 50,000 Units by mouth 1 (One) Time Per Week.     • fenofibrate micronized (LOFIBRA) 134 MG capsule Take 134 mg by mouth Every Morning Before Breakfast.     • ibuprofen (ADVIL,MOTRIN) 800 MG tablet Take 1 tablet by mouth Every 6 (Six) Hours As Needed for Moderate Pain . 30 tablet 0   • Insulin Glargine (BASAGLAR KWIKPEN SC) Inject 30 Units under the skin into the appropriate area as directed Every Night.     • losartan (COZAAR) 50 MG tablet Take 50 mg by mouth Daily.     • metFORMIN XR (GLUCOPHAGE-XR) 500 MG 24 hr tablet Take 1,000 mg by mouth 2 (two) times a day.     • omeprazole (priLOSEC) 20 MG capsule Take 20 mg by mouth Daily.     • docusate calcium (SURFAK) 240 MG capsule Take 1 capsule by mouth 2 (two) times a day. 60 capsule 0   • glipiZIDE (GLUCOTROL) 5 MG tablet Take 5 mg by mouth 2 (two) times a day before meals.     • lisinopril (PRINIVIL,ZESTRIL) 10 MG tablet Take 10 mg by mouth daily.     • metaxalone (SKELAXIN) 800 MG tablet Take 1 tablet by mouth 3 (Three) Times a Day As Needed for Muscle Spasms. 9 tablet 0   • Omega-3 Fatty Acids (FISH OIL) 1000 MG capsule capsule Take 1,000 mg by mouth daily with breakfast.     • ondansetron ODT (ZOFRAN-ODT) 4 MG disintegrating tablet Take 1 tablet by mouth Every 6 (Six) Hours As Needed for Nausea or Vomiting. 10 tablet 0   • ONE TOUCH ULTRA TEST test strip      • ONETOUCH DELICA LANCETS 33G misc      • pantoprazole (PROTONIX) 40 MG EC tablet Take 1 tablet by mouth Daily. 30 tablet 0   • SUPREP  BOWEL PREP solution oral solution Dispense one Kit        Sig: Used as Directed 2 bottle 0     No current facility-administered medications on file prior to visit.        ALLERGIES  Latex    HISTORY  Past Medical History:   Diagnosis Date   • Anemia    • Diabetes mellitus (CMS/HCC)    • Fatigue    • Gallbladder disease    • High cholesterol    • Hypertension    • Migraine    • Sinusitis     CURRENTLY       Social History     Socioeconomic History   • Marital status:      Spouse name: Not on file   • Number of children: Not on file   • Years of education: Not on file   • Highest education level: Not on file   Tobacco Use   • Smoking status: Never Smoker   • Smokeless tobacco: Never Used   Substance and Sexual Activity   • Alcohol use: No   • Drug use: No   • Sexual activity: Defer     Birth control/protection: Surgical       Family History   Problem Relation Age of Onset   • Heart disease Mother    • Cancer Father    • Cancer Sister    • Diabetes Sister    • Hypertension Sister    • Diabetes Brother    • Heart disease Brother    • Hypertension Brother    • Cancer Paternal Aunt    • Cancer Paternal Uncle    • Diabetes Maternal Grandmother    • Breast cancer Maternal Aunt        Review of Systems   Respiratory: Positive for shortness of breath.    Cardiovascular: Positive for leg swelling.   Musculoskeletal: Positive for arthralgias.   Neurological: Positive for numbness and headaches.   Hematological: Bruises/bleeds easily.       Objective     VITALS: There were no vitals taken for this visit.    LABS:   Lab Results (most recent)     None          IMAGING:   Dexa Bone Density Axial    Result Date: 7/24/2020  1. According to the World Health Organization definitions of osteoporosis based on bone density, this patient's bone mineral density is compatible with  osteopenia and the fracture risk is moderate. 2. Osteopenia in the right femoral neck.  This report was finalized on 7/24/2020 9:10 AM by Dr. Julio Snyder,  "MD.        EXAM:  Physical Exam    Procedure     ECG 12 Lead  Date/Time: 8/24/2020 1:36 PM  Performed by: Conrad Choi MD  Authorized by: Conrad Choi MD                  Assessment/Plan   No diagnosis found.    No follow-ups on file.    There are no diagnoses linked to this encounter.    Santa Valdovinos  reports that she has never smoked. She has never used smokeless tobacco.. I have educated her on the risk of diseases from using tobacco products such as {Tobacco Cessation Diseases:53343::\"cancer\",\"COPD\",\"heart diease\"}.     I advised her to quit and she is {Willing/Not Willing to Quit Tobacco Products:98049}.    I spent {Time Spent Tobacco :85997} minutes counseling the patient.           Patient's There is no height or weight on file to calculate BMI. BMI is {BMI range:41518}.           MEDS ORDERED DURING VISIT:  No orders of the defined types were placed in this encounter.        This document has been electronically signed by Conrad Choi MD  August 24, 2020 13:34      "

## 2020-09-24 ENCOUNTER — HOSPITAL ENCOUNTER (OUTPATIENT)
Dept: CARDIOLOGY | Facility: HOSPITAL | Age: 60
Discharge: HOME | End: 2020-09-24

## 2020-09-24 ENCOUNTER — HOSPITAL ENCOUNTER (OUTPATIENT)
Dept: NUCLEAR MEDICINE | Facility: HOSPITAL | Age: 60
Discharge: HOME | End: 2020-09-24

## 2020-09-24 DIAGNOSIS — R07.2 PRECORDIAL PAIN: ICD-10-CM

## 2020-09-24 DIAGNOSIS — R06.02 SHORTNESS OF BREATH: ICD-10-CM

## 2020-09-24 LAB
BH CV ECHO MEAS - % IVS THICK: 12.4 %
BH CV ECHO MEAS - % LVPW THICK: 4.3 %
BH CV ECHO MEAS - ACS: 2 CM
BH CV ECHO MEAS - AO MAX PG: 13.7 MMHG
BH CV ECHO MEAS - AO MEAN PG: 7 MMHG
BH CV ECHO MEAS - AO ROOT AREA (BSA CORRECTED): 1.3
BH CV ECHO MEAS - AO ROOT AREA: 4.7 CM^2
BH CV ECHO MEAS - AO ROOT DIAM: 2.5 CM
BH CV ECHO MEAS - AO V2 MAX: 185 CM/SEC
BH CV ECHO MEAS - AO V2 MEAN: 119 CM/SEC
BH CV ECHO MEAS - AO V2 VTI: 45.6 CM
BH CV ECHO MEAS - BSA(HAYCOCK): 2 M^2
BH CV ECHO MEAS - BSA: 1.8 M^2
BH CV ECHO MEAS - BZI_BMI: 35.7 KILOGRAMS/M^2
BH CV ECHO MEAS - BZI_METRIC_HEIGHT: 154.9 CM
BH CV ECHO MEAS - BZI_METRIC_WEIGHT: 85.7 KG
BH CV ECHO MEAS - EDV(CUBED): 136.2 ML
BH CV ECHO MEAS - EDV(MOD-SP4): 59.6 ML
BH CV ECHO MEAS - EDV(TEICH): 126.4 ML
BH CV ECHO MEAS - EF(CUBED): 49.8 %
BH CV ECHO MEAS - EF(MOD-SP4): 60.2 %
BH CV ECHO MEAS - EF(TEICH): 41.6 %
BH CV ECHO MEAS - ESV(CUBED): 68.4 ML
BH CV ECHO MEAS - ESV(MOD-SP4): 23.7 ML
BH CV ECHO MEAS - ESV(TEICH): 73.8 ML
BH CV ECHO MEAS - FS: 20.5 %
BH CV ECHO MEAS - IVS/LVPW: 1.1
BH CV ECHO MEAS - IVSD: 1.1 CM
BH CV ECHO MEAS - IVSS: 1.3 CM
BH CV ECHO MEAS - LA DIMENSION: 3.3 CM
BH CV ECHO MEAS - LA/AO: 1.3
BH CV ECHO MEAS - LV DIASTOLIC VOL/BSA (35-75): 32.3 ML/M^2
BH CV ECHO MEAS - LV MASS(C)D: 216.2 GRAMS
BH CV ECHO MEAS - LV MASS(C)DI: 117.2 GRAMS/M^2
BH CV ECHO MEAS - LV MASS(C)S: 169 GRAMS
BH CV ECHO MEAS - LV MASS(C)SI: 91.6 GRAMS/M^2
BH CV ECHO MEAS - LV SYSTOLIC VOL/BSA (12-30): 12.9 ML/M^2
BH CV ECHO MEAS - LVIDD: 5.1 CM
BH CV ECHO MEAS - LVIDS: 4.1 CM
BH CV ECHO MEAS - LVLD AP4: 6.4 CM
BH CV ECHO MEAS - LVLS AP4: 6.5 CM
BH CV ECHO MEAS - LVOT AREA (M): 3.1 CM^2
BH CV ECHO MEAS - LVOT AREA: 3.1 CM^2
BH CV ECHO MEAS - LVOT DIAM: 2 CM
BH CV ECHO MEAS - LVPWD: 1.1 CM
BH CV ECHO MEAS - LVPWS: 1.1 CM
BH CV ECHO MEAS - MV A MAX VEL: 106 CM/SEC
BH CV ECHO MEAS - MV E MAX VEL: 125 CM/SEC
BH CV ECHO MEAS - MV E/A: 1.2
BH CV ECHO MEAS - PA ACC TIME: 0.07 SEC
BH CV ECHO MEAS - PA PR(ACCEL): 47.5 MMHG
BH CV ECHO MEAS - RAP SYSTOLE: 10 MMHG
BH CV ECHO MEAS - RVSP: 31.5 MMHG
BH CV ECHO MEAS - SI(AO): 116.6 ML/M^2
BH CV ECHO MEAS - SI(CUBED): 36.7 ML/M^2
BH CV ECHO MEAS - SI(MOD-SP4): 19.5 ML/M^2
BH CV ECHO MEAS - SI(TEICH): 28.5 ML/M^2
BH CV ECHO MEAS - SV(AO): 215 ML
BH CV ECHO MEAS - SV(CUBED): 67.8 ML
BH CV ECHO MEAS - SV(MOD-SP4): 35.9 ML
BH CV ECHO MEAS - SV(TEICH): 52.6 ML
BH CV ECHO MEAS - TR MAX VEL: 232 CM/SEC
BH CV STRESS BP STAGE 1: NORMAL
BH CV STRESS DURATION MIN STAGE 1: 2
BH CV STRESS DURATION SEC STAGE 1: 11
BH CV STRESS GRADE STAGE 1: 10
BH CV STRESS HR STAGE 1: 144
BH CV STRESS METS STAGE 1: 5
BH CV STRESS PROTOCOL 1: NORMAL
BH CV STRESS RECOVERY BP: NORMAL MMHG
BH CV STRESS RECOVERY HR: 81 BPM
BH CV STRESS SPEED STAGE 1: 1.7
BH CV STRESS STAGE 1: 1
LV EF NUC BP: 56 %
MAXIMAL PREDICTED HEART RATE: 162 BPM
MAXIMAL PREDICTED HEART RATE: 162 BPM
PERCENT MAX PREDICTED HR: 88.89 %
STRESS BASELINE BP: NORMAL MMHG
STRESS BASELINE HR: 82 BPM
STRESS PERCENT HR: 105 %
STRESS POST ESTIMATED WORKLOAD: 4.6 METS
STRESS POST EXERCISE DUR MIN: 2 MIN
STRESS POST EXERCISE DUR SEC: 11 SEC
STRESS POST PEAK BP: NORMAL MMHG
STRESS POST PEAK HR: 144 BPM
STRESS TARGET HR: 138 BPM
STRESS TARGET HR: 138 BPM

## 2020-09-24 PROCEDURE — A9500 TC99M SESTAMIBI: HCPCS | Performed by: SPECIALIST

## 2020-09-24 PROCEDURE — 0 TECHNETIUM SESTAMIBI: Performed by: SPECIALIST

## 2020-09-24 PROCEDURE — 93018 CV STRESS TEST I&R ONLY: CPT | Performed by: SPECIALIST

## 2020-09-24 PROCEDURE — 78452 HT MUSCLE IMAGE SPECT MULT: CPT | Performed by: SPECIALIST

## 2020-09-24 PROCEDURE — 93306 TTE W/DOPPLER COMPLETE: CPT

## 2020-09-24 PROCEDURE — 78452 HT MUSCLE IMAGE SPECT MULT: CPT

## 2020-09-24 PROCEDURE — 93306 TTE W/DOPPLER COMPLETE: CPT | Performed by: SPECIALIST

## 2020-09-24 PROCEDURE — 93017 CV STRESS TEST TRACING ONLY: CPT

## 2020-09-24 RX ADMIN — TECHNETIUM TC 99M SESTAMIBI 1 DOSE: 1 INJECTION INTRAVENOUS at 09:36

## 2020-09-24 RX ADMIN — TECHNETIUM TC 99M SESTAMIBI 1 DOSE: 1 INJECTION INTRAVENOUS at 08:22

## 2020-10-07 ENCOUNTER — HOSPITAL ENCOUNTER (OUTPATIENT)
Dept: MAMMOGRAPHY | Facility: HOSPITAL | Age: 60
Discharge: HOME | End: 2020-10-07
Admitting: NURSE PRACTITIONER

## 2020-10-07 DIAGNOSIS — Z12.31 VISIT FOR SCREENING MAMMOGRAM: ICD-10-CM

## 2020-10-07 PROCEDURE — 77063 BREAST TOMOSYNTHESIS BI: CPT

## 2020-10-07 PROCEDURE — 77067 SCR MAMMO BI INCL CAD: CPT

## 2020-10-08 PROCEDURE — 77067 SCR MAMMO BI INCL CAD: CPT | Performed by: RADIOLOGY

## 2020-10-08 PROCEDURE — 77063 BREAST TOMOSYNTHESIS BI: CPT | Performed by: RADIOLOGY

## 2020-10-20 ENCOUNTER — OFFICE VISIT (OUTPATIENT)
Dept: CARDIOLOGY | Facility: CLINIC | Age: 60
End: 2020-10-20

## 2020-10-20 VITALS
WEIGHT: 188.8 LBS | BODY MASS INDEX: 35.65 KG/M2 | RESPIRATION RATE: 16 BRPM | DIASTOLIC BLOOD PRESSURE: 62 MMHG | HEIGHT: 61 IN | HEART RATE: 70 BPM | SYSTOLIC BLOOD PRESSURE: 102 MMHG | TEMPERATURE: 98.6 F

## 2020-10-20 DIAGNOSIS — R07.2 PRECORDIAL PAIN: ICD-10-CM

## 2020-10-20 DIAGNOSIS — E78.2 MIXED HYPERLIPIDEMIA: ICD-10-CM

## 2020-10-20 DIAGNOSIS — R06.02 SHORTNESS OF BREATH: ICD-10-CM

## 2020-10-20 DIAGNOSIS — I10 ESSENTIAL HYPERTENSION: Primary | ICD-10-CM

## 2020-10-20 PROCEDURE — 99213 OFFICE O/P EST LOW 20 MIN: CPT | Performed by: SPECIALIST

## 2020-10-20 RX ORDER — CETIRIZINE HYDROCHLORIDE 10 MG/1
10 TABLET ORAL DAILY
COMMUNITY

## 2020-10-20 RX ORDER — ASPIRIN 81 MG/1
81 TABLET ORAL DAILY
COMMUNITY

## 2020-10-20 RX ORDER — FERROUS SULFATE 325(65) MG
325 TABLET ORAL 3 TIMES WEEKLY
COMMUNITY

## 2020-10-20 RX ORDER — METHOCARBAMOL 500 MG/1
500 TABLET, FILM COATED ORAL DAILY
COMMUNITY

## 2020-10-20 NOTE — PROGRESS NOTES
"Subjective   Follow up, stress test result  Santa Valdovinos is a 59 y.o. female who presents to day for Results (stress and echo findings), Shortness of Breath (\"little\"), Edema (LE), and Med Management (list provided).    CHIEF COMPLIANT  Chief Complaint   Patient presents with   • Results     stress and echo findings   • Shortness of Breath     \"little\"   • Edema     LE   • Med Management     list provided       Active Problems:  Problem List Items Addressed This Visit        Cardiovascular and Mediastinum    Essential hypertension - Primary    Mixed hyperlipidemia       Respiratory    Shortness of breath       Nervous and Auditory    Precordial pain          HPI  HPI  She feels a lot better, with no chest pain, but with stable shortness of breath on moderate exertion, and with intermittent edema, she denies palpitations  PRIOR MEDS  Current Outpatient Medications on File Prior to Visit   Medication Sig Dispense Refill   • aspirin 81 MG EC tablet Take 81 mg by mouth Daily.     • atorvastatin (LIPITOR) 20 MG tablet Take 40 mg by mouth Daily.     • cetirizine (zyrTEC) 10 MG tablet Take 10 mg by mouth Daily.     • cholecalciferol (VITAMIN D3) 1.25 MG (88380 UT) capsule Take 50,000 Units by mouth 1 (One) Time Per Week.     • fenofibrate micronized (LOFIBRA) 134 MG capsule Take 134 mg by mouth Every Morning Before Breakfast.     • ferrous sulfate 325 (65 FE) MG tablet Take 325 mg by mouth 3 (Three) Times a Week.     • ibuprofen (ADVIL,MOTRIN) 800 MG tablet Take 1 tablet by mouth Every 6 (Six) Hours As Needed for Moderate Pain . 30 tablet 0   • Insulin Glargine (BASAGLAR KWIKPEN SC) Inject 30 Units under the skin into the appropriate area as directed Every Night.     • losartan (COZAAR) 50 MG tablet Take 100 mg by mouth Daily.     • metFORMIN XR (GLUCOPHAGE-XR) 500 MG 24 hr tablet Take 1,000 mg by mouth 2 (two) times a day.     • methocarbamol (ROBAXIN) 500 MG tablet Take 500 mg by mouth Daily.     • omeprazole " (priLOSEC) 20 MG capsule Take 20 mg by mouth Daily.     • docusate calcium (SURFAK) 240 MG capsule Take 1 capsule by mouth 2 (two) times a day. 60 capsule 0   • glipiZIDE (GLUCOTROL) 5 MG tablet Take 5 mg by mouth 2 (two) times a day before meals.     • lisinopril (PRINIVIL,ZESTRIL) 10 MG tablet Take 10 mg by mouth daily.     • metaxalone (SKELAXIN) 800 MG tablet Take 1 tablet by mouth 3 (Three) Times a Day As Needed for Muscle Spasms. 9 tablet 0   • Omega-3 Fatty Acids (FISH OIL) 1000 MG capsule capsule Take 1,000 mg by mouth daily with breakfast.     • ondansetron ODT (ZOFRAN-ODT) 4 MG disintegrating tablet Take 1 tablet by mouth Every 6 (Six) Hours As Needed for Nausea or Vomiting. 10 tablet 0   • ONE TOUCH ULTRA TEST test strip      • ONETOUCH DELICA LANCETS 33G misc      • pantoprazole (PROTONIX) 40 MG EC tablet Take 1 tablet by mouth Daily. 30 tablet 0   • SUPREP BOWEL PREP solution oral solution Dispense one Kit        Sig: Used as Directed 2 bottle 0     No current facility-administered medications on file prior to visit.        ALLERGIES  Latex    HISTORY  Past Medical History:   Diagnosis Date   • Anemia    • Diabetes mellitus (CMS/HCC)    • Fatigue    • Gallbladder disease    • High cholesterol    • Hypertension    • Migraine    • Sinusitis     CURRENTLY       Social History     Socioeconomic History   • Marital status:      Spouse name: Not on file   • Number of children: Not on file   • Years of education: Not on file   • Highest education level: Not on file   Tobacco Use   • Smoking status: Never Smoker   • Smokeless tobacco: Never Used   Substance and Sexual Activity   • Alcohol use: No   • Drug use: No   • Sexual activity: Defer     Birth control/protection: Surgical       Family History   Problem Relation Age of Onset   • Heart disease Mother    • Cancer Father    • Cancer Sister    • Diabetes Sister    • Hypertension Sister    • Diabetes Brother    • Heart disease Brother    • Hypertension  "Brother    • Cancer Paternal Aunt    • Cancer Paternal Uncle    • Diabetes Maternal Grandmother    • Breast cancer Maternal Aunt        Review of Systems   Constitutional: Negative for activity change and appetite change.   HENT: Negative for congestion.    Eyes: Negative for discharge and itching.   Respiratory: Positive for chest tightness and shortness of breath. Negative for apnea, cough, choking and stridor.    Cardiovascular: Positive for leg swelling. Negative for chest pain and palpitations.   Gastrointestinal: Negative for abdominal distention and abdominal pain.   Endocrine: Negative for cold intolerance and heat intolerance.   Genitourinary: Negative for flank pain.   Musculoskeletal: Negative for neck stiffness.   Skin: Negative for color change and pallor.   Allergic/Immunologic: Negative for immunocompromised state.   Neurological: Positive for dizziness.   Hematological: Does not bruise/bleed easily.   Psychiatric/Behavioral: Negative for agitation and behavioral problems.       Objective     VITALS: /62   Pulse 70   Temp 98.6 °F (37 °C)   Resp 16   Ht 154.9 cm (61\")   Wt 85.6 kg (188 lb 12.8 oz)   BMI 35.67 kg/m²     LABS:   Lab Results (most recent)     None          IMAGING:   Dexa Bone Density Axial    Result Date: 7/24/2020  1. According to the World Health Organization definitions of osteoporosis based on bone density, this patient's bone mineral density is compatible with  osteopenia and the fracture risk is moderate. 2. Osteopenia in the right femoral neck.  This report was finalized on 7/24/2020 9:10 AM by Dr. Julio Snyder MD.      Mammo Screening Digital Tomosynthesis Bilateral With Cad    Result Date: 10/8/2020  No findings suspicious for malignancy.  BI-RADS CATEGORY:  1, NEGATIVE  RECOMMENDATION: Yearly mammogram, yearly clinical breast exam, and encourage self breast awareness.  CAD was used.  The standard false negative rate of mammography is between 10% and 25%. Complex " patterns or increased breast density will markedly elevate the false negative rate of mammography.  A letter, in lay terminology, with the results of this exam will be mailed to the patient.   If there is a palpable area of concern, biopsy should be considered regardless of imaging findings.  This report was finalized on 10/8/2020 10:17 AM by Dr. Palak Molina MD.        EXAM:  Physical Exam  Vitals signs reviewed.   Constitutional:       Appearance: She is well-developed.   HENT:      Head: Normocephalic and atraumatic.   Eyes:      Pupils: Pupils are equal, round, and reactive to light.   Neck:      Musculoskeletal: Neck supple.      Thyroid: No thyromegaly.      Vascular: No JVD.   Cardiovascular:      Rate and Rhythm: Normal rate and regular rhythm.      Heart sounds: Normal heart sounds. No murmur. No friction rub. No gallop.    Pulmonary:      Effort: Pulmonary effort is normal. No respiratory distress.      Breath sounds: Normal breath sounds. No stridor. No wheezing or rales.   Chest:      Chest wall: No tenderness.   Musculoskeletal:         General: No tenderness or deformity.   Skin:     General: Skin is warm and dry.   Neurological:      Mental Status: She is alert and oriented to person, place, and time.      Cranial Nerves: No cranial nerve deficit.      Coordination: Coordination normal.         Procedure   Procedures       Assessment/Plan    Diagnosis Plan   1. Essential hypertension     2. Mixed hyperlipidemia     3. Shortness of breath     4. Precordial pain     1.  Blood pressures well controlled continue current medications  2.  I discussed the results of the stress test she has no further chest pain at all so we will continue the current management 3. 3.  I discussed echocardiogram with diastolic dysfunction advised to eat a low-salt diet  4.  I have reviewed her labs with elevated triglyceride and LDL her medications been changed now she is on atorvastatin and fenofibrate will  monitor        No follow-ups on file.    Diagnoses and all orders for this visit:    1. Essential hypertension (Primary)    2. Mixed hyperlipidemia    3. Shortness of breath    4. Precordial pain                   MEDS ORDERED DURING VISIT:  No orders of the defined types were placed in this encounter.        This document has been electronically signed by Conrad Choi MD  October 20, 2020 11:34 EDT

## 2020-11-05 NOTE — PROGRESS NOTES
Subjective   Initial consultation, chest pain  Santa Valdovinos is a 59 y.o. female who presents to day for Chest Pain (er visit [place], [place] 1-2 mos ago).    CHIEF COMPLIANT  Chief Complaint   Patient presents with   • Chest Pain     er visit [place], [place] 1-2 mos ago       Active Problems:  Problem List Items Addressed This Visit        Cardiovascular and Mediastinum    Essential hypertension    Relevant Medications    losartan (COZAAR) 50 MG tablet    Mixed hyperlipidemia    Relevant Medications    fenofibrate micronized (LOFIBRA) 134 MG capsule       Respiratory    Shortness of breath       Endocrine    Uncontrolled type 2 diabetes mellitus with hyperglycemia (CMS/[facility])    Relevant Medications    Insulin Glargine (BASAGLAR KWIKPEN SC)       Nervous and Auditory    Precordial pain - Primary          HPI  HPI  For more than a year she is having been having intermittent chest pains retrosternally radiating to the left arm on and off she was seen in the emergency room and at that time she was discharged home she still getting since occasional chest pressure with the radiation to the left arm the longest was about an hour when she had the time with the emergency room gets about every 2 weeks or so not particular associated with exertion or food she is also short of breath on walking in the yard or doing moderate work and she has to stop and take some time to relieve her breathing she also had mild lower extremity edema history at night and the end of the day occasionally also she gets palpitations has diabetes which still not very well controlled with a hemoglobin A1c she said about 9.7 she also has hypertension she never smoked also has hyperlipidemia and she takes a statin both of her parents and 2 brothers had also had heart conditions 1 of her brother had CABG in his 40s  PRIOR MEDS  Current Outpatient Medications on File Prior to Visit   Medication Sig Dispense Refill   • atorvastatin (LIPITOR) 20 MG  tablet Take 40 mg by mouth Daily.     • cholecalciferol (VITAMIN D3) 1.25 MG (85026 UT) capsule Take 50,000 Units by mouth 1 (One) Time Per Week.     • fenofibrate micronized (LOFIBRA) 134 MG capsule Take 134 mg by mouth Every Morning Before Breakfast.     • ibuprofen (ADVIL,MOTRIN) 800 MG tablet Take 1 tablet by mouth Every 6 (Six) Hours As Needed for Moderate Pain . 30 tablet 0   • Insulin Glargine (BASAGLAR KWIKPEN SC) Inject 30 Units under the skin into the appropriate area as directed Every Night.     • losartan (COZAAR) 50 MG tablet Take 50 mg by mouth Daily.     • metFORMIN XR (GLUCOPHAGE-XR) 500 MG 24 hr tablet Take 1,000 mg by mouth 2 (two) times a day.     • omeprazole (priLOSEC) 20 MG capsule Take 20 mg by mouth Daily.     • docusate calcium (SURFAK) 240 MG capsule Take 1 capsule by mouth 2 (two) times a day. 60 capsule 0   • glipiZIDE (GLUCOTROL) 5 MG tablet Take 5 mg by mouth 2 (two) times a day before meals.     • lisinopril (PRINIVIL,ZESTRIL) 10 MG tablet Take 10 mg by mouth daily.     • metaxalone (SKELAXIN) 800 MG tablet Take 1 tablet by mouth 3 (Three) Times a Day As Needed for Muscle Spasms. 9 tablet 0   • Omega-3 Fatty Acids (FISH OIL) 1000 MG capsule capsule Take 1,000 mg by mouth daily with breakfast.     • ondansetron ODT (ZOFRAN-ODT) 4 MG disintegrating tablet Take 1 tablet by mouth Every 6 (Six) Hours As Needed for Nausea or Vomiting. 10 tablet 0   • ONE TOUCH ULTRA TEST test strip      • ONETOUCH DELICA LANCETS 33G misc      • pantoprazole (PROTONIX) 40 MG EC tablet Take 1 tablet by mouth Daily. 30 tablet 0   • SUPREP BOWEL PREP solution oral solution Dispense one Kit        Sig: Used as Directed 2 bottle 0     No current facility-administered medications on file prior to visit.        ALLERGIES  Latex    HISTORY  Past Medical History:   Diagnosis Date   • Anemia    • Diabetes mellitus (CMS/HCC)    • Fatigue    • Gallbladder disease    • High cholesterol    • Hypertension    • Migraine    •  "Sinusitis     CURRENTLY       Social History     Socioeconomic History   • Marital status:      Spouse name: Not on file   • Number of children: Not on file   • Years of education: Not on file   • Highest education level: Not on file   Tobacco Use   • Smoking status: Never Smoker   • Smokeless tobacco: Never Used   Substance and Sexual Activity   • Alcohol use: No   • Drug use: No   • Sexual activity: Defer     Birth control/protection: Surgical       Family History   Problem Relation Age of Onset   • Heart disease Mother    • Cancer Father    • Cancer Sister    • Diabetes Sister    • Hypertension Sister    • Diabetes Brother    • Heart disease Brother    • Hypertension Brother    • Cancer Paternal Aunt    • Cancer Paternal Uncle    • Diabetes Maternal Grandmother    • Breast cancer Maternal Aunt        Review of Systems   Constitutional: Positive for activity change. Negative for appetite change.   HENT: Negative for congestion.    Eyes: Negative for discharge and itching.   Respiratory: Positive for chest tightness and shortness of breath. Negative for apnea, cough, choking, wheezing and stridor.    Cardiovascular: Positive for chest pain, palpitations and leg swelling.   Gastrointestinal: Negative for abdominal distention and abdominal pain.   Endocrine: Negative for cold intolerance and heat intolerance.   Genitourinary: Negative for flank pain.   Neurological: Negative for facial asymmetry.   Hematological: Does not bruise/bleed easily.   Psychiatric/Behavioral: Negative for agitation and behavioral problems.       Objective     VITALS: /69 (BP Location: Left arm)   Pulse 63   Temp 97.5 °F (36.4 °C)   Resp 16   Ht 154.9 cm (61\")   Wt 85.8 kg (189 lb 3.2 oz)   BMI 35.75 kg/m²  [unfilled]    LABS:   Lab Results (most recent)     None          IMAGING:   Dexa Bone Density Axial    Result Date: 7/24/2020  1. According to the World Health Organization definitions of osteoporosis based on bone " density, this patient's bone mineral density is compatible with  osteopenia and the fracture risk is moderate. 2. Osteopenia in the right femoral neck.  This report was finalized on 7/24/2020 9:10 AM by Dr. Julio Snyder MD.        EXAM:  Physical Exam   Constitutional: She is oriented to person, place, and time. She appears well-developed and well-nourished.   HENT:   Head: Normocephalic and atraumatic.   Eyes: Pupils are equal, round, and reactive to light.   Neck: Neck supple. No JVD present. No thyromegaly present.   Cardiovascular: Normal rate, regular rhythm, normal heart sounds and intact distal pulses. Exam reveals no gallop and no friction rub.   No murmur heard.  Pulmonary/Chest: Effort normal and breath sounds normal. No stridor. No respiratory distress. She has no wheezes. She has no rales. She exhibits no tenderness.   Musculoskeletal: She exhibits no edema, tenderness or deformity.   Neurological: She is alert and oriented to person, place, and time. No cranial nerve deficit. Coordination normal.   Skin: Skin is warm and dry.   Psychiatric: She has a normal mood and affect.   Vitals reviewed.      Procedure   Procedures     EKG: Sinus bradycardia ( 59/min ), LAD, oherwise unremarkable EKG, Compare with EKG on 7/22/19 bradycardia is new  Assessment/Plan    Diagnosis Plan   1. Precordial pain     2. Shortness of breath     3. Essential hypertension     4. Mixed hyperlipidemia     5. Uncontrolled type 2 diabetes mellitus with hyperglycemia (CMS/HCC)       1.  Chest pain with atypical and typical features we will proceed with stress testing for assessment of ischemia advised to take baby aspirin 81 mg/day  2.  With the shortness of breath get an echocardiogram to assess her cardiac function wall motion and valve morphology  3.  Her blood pressures well controlled we will continue current medications  4.  She had lipid profile she said about 2 months ago at her primary care physician we will try to get the  report  5.  Diet and exercise discussed for diabetes control  No follow-ups on file.    Santa was seen today for chest pain.    Diagnoses and all orders for this visit:    Precordial pain    Shortness of breath    Essential hypertension    Mixed hyperlipidemia    Uncontrolled type 2 diabetes mellitus with hyperglycemia (CMS/[facility])    Other orders  -     ECG 12 Lead                   MEDS ORDERED DURING VISIT:  No orders of the defined types were placed in this encounter.          This document has been electronically signed by   August 24, 2020 13:57   Normal for race

## 2021-02-10 ENCOUNTER — APPOINTMENT (OUTPATIENT)
Dept: CT IMAGING | Facility: HOSPITAL | Age: 61
End: 2021-02-10

## 2021-02-10 ENCOUNTER — HOSPITAL ENCOUNTER (EMERGENCY)
Facility: HOSPITAL | Age: 61
Discharge: HOME | End: 2021-02-10
Attending: EMERGENCY MEDICINE | Admitting: EMERGENCY MEDICINE

## 2021-02-10 ENCOUNTER — APPOINTMENT (OUTPATIENT)
Dept: GENERAL RADIOLOGY | Facility: HOSPITAL | Age: 61
End: 2021-02-10

## 2021-02-10 VITALS
SYSTOLIC BLOOD PRESSURE: 145 MMHG | RESPIRATION RATE: 18 BRPM | HEIGHT: 61 IN | OXYGEN SATURATION: 98 % | DIASTOLIC BLOOD PRESSURE: 77 MMHG | HEART RATE: 80 BPM | WEIGHT: 198 LBS | TEMPERATURE: 98 F | BODY MASS INDEX: 37.38 KG/M2

## 2021-02-10 DIAGNOSIS — S46.911A STRAIN OF RIGHT SHOULDER, INITIAL ENCOUNTER: ICD-10-CM

## 2021-02-10 DIAGNOSIS — S22.41XA CLOSED FRACTURE OF MULTIPLE RIBS OF RIGHT SIDE, INITIAL ENCOUNTER: Primary | ICD-10-CM

## 2021-02-10 PROCEDURE — 71250 CT THORAX DX C-: CPT

## 2021-02-10 PROCEDURE — 74176 CT ABD & PELVIS W/O CONTRAST: CPT | Performed by: RADIOLOGY

## 2021-02-10 PROCEDURE — 74176 CT ABD & PELVIS W/O CONTRAST: CPT

## 2021-02-10 PROCEDURE — 99283 EMERGENCY DEPT VISIT LOW MDM: CPT

## 2021-02-10 PROCEDURE — 71250 CT THORAX DX C-: CPT | Performed by: RADIOLOGY

## 2021-02-10 PROCEDURE — 73030 X-RAY EXAM OF SHOULDER: CPT

## 2021-02-10 PROCEDURE — 73030 X-RAY EXAM OF SHOULDER: CPT | Performed by: RADIOLOGY

## 2021-02-10 RX ORDER — HYDROCODONE BITARTRATE AND ACETAMINOPHEN 5; 325 MG/1; MG/1
1 TABLET ORAL EVERY 6 HOURS PRN
Qty: 6 TABLET | Refills: 0 | Status: SHIPPED | OUTPATIENT
Start: 2021-02-10

## 2021-02-10 NOTE — ED PROVIDER NOTES
Subjective   Patient is a 60-year-old female with diabetes, hypertension, and hyperlipidemia that presents the ED after sustaining a fall approximately 30 minutes ago.  She states she was taking a shower when she slipped and fell.  She states that she struck the side of the bathtub right along her right side of her ribs and then hit her right shoulder on the tub.  She is complaining of right rib pain worse when she takes a deep breath or coughs.  She is also complaining of shoulder pain but she is able to move the arm.  She denies chest pain, shortness of breath, fever, chills, nausea, vomiting, abdominal pain, or dysuria.  She denies hitting her head or losing consciousness.      History provided by:  Patient   used: No    Fall  Mechanism of injury: fall    Injury location:  Shoulder/arm and torso  Shoulder/arm injury location:  R shoulder  Torso injury location:  R chest  Incident location:  Home  Time since incident:  30 minutes  Arrived directly from scene: yes    Fall:     Fall occurred:  In the bathroom    Impact surface: bathtub.    Point of impact:  Front and outstretched arms  Suspicion of alcohol use: no    Suspicion of drug use: no    Prior to arrival data:     Patient ambulatory at scene: yes      Loss of consciousness: no      Amnesic to event: no    Associated symptoms: no abdominal pain, no back pain, no blindness, no chest pain, no difficulty breathing, no headaches, no hearing loss, no loss of consciousness, no nausea, no neck pain, no seizures and no vomiting    Risk factors: diabetes        Review of Systems   Constitutional: Negative.  Negative for chills, diaphoresis and fever.   HENT: Negative.  Negative for congestion, ear discharge, ear pain, facial swelling, hearing loss, sinus pressure, sinus pain, sneezing, sore throat, tinnitus and trouble swallowing.    Eyes: Negative.  Negative for blindness, photophobia, pain, discharge, redness and itching.   Respiratory: Negative.   Negative for cough, shortness of breath and wheezing.    Cardiovascular: Negative.  Negative for chest pain.   Gastrointestinal: Negative.  Negative for abdominal distention, abdominal pain, constipation, nausea and vomiting.   Endocrine: Negative.    Genitourinary: Negative.  Negative for difficulty urinating, dysuria, flank pain, frequency and urgency.   Musculoskeletal: Positive for arthralgias. Negative for back pain, gait problem, joint swelling, myalgias, neck pain and neck stiffness.   Skin: Negative.    Neurological: Negative.  Negative for dizziness, seizures, loss of consciousness, syncope, facial asymmetry, weakness, light-headedness, numbness and headaches.   Psychiatric/Behavioral: Negative.  Negative for confusion.   All other systems reviewed and are negative.      Past Medical History:   Diagnosis Date   • Anemia    • Diabetes mellitus (CMS/HCC)    • Fatigue    • Gallbladder disease    • High cholesterol    • Hypertension    • Migraine    • Sinusitis     CURRENTLY       Allergies   Allergen Reactions   • Latex Rash       Past Surgical History:   Procedure Laterality Date   • ABDOMINAL SURGERY     •  SECTION      times 3   • CHOLECYSTECTOMY N/A 2016    Procedure: CHOLECYSTECTOMY LAPAROSCOPIC;  Surgeon: Anil Resendez MD;  Location: [facility];  Service:    • COLONOSCOPY     • COLONOSCOPY N/A 9/15/2016    Procedure: COLONOSCOPY;  Surgeon: Anil Resendez MD;  Location: [facility];  Service:    • TUBAL ABDOMINAL LIGATION         Family History   Problem Relation Age of Onset   • Heart disease Mother    • Cancer Father    • Cancer Sister    • Diabetes Sister    • Hypertension Sister    • Diabetes Brother    • Heart disease Brother    • Hypertension Brother    • Cancer Paternal Aunt    • Cancer Paternal Uncle    • Diabetes Maternal Grandmother    • Breast cancer Maternal Aunt        Social History     Socioeconomic History   • Marital status:      Spouse name: Not on file   • Number of  children: Not on file   • Years of education: Not on file   • Highest education level: Not on file   Tobacco Use   • Smoking status: Never Smoker   • Smokeless tobacco: Never Used   Substance and Sexual Activity   • Alcohol use: No   • Drug use: No   • Sexual activity: Defer     Birth control/protection: Surgical           Objective   Physical Exam  Vitals signs and nursing note reviewed.   Constitutional:       General: She is not in acute distress.     Appearance: She is well-developed. She is not diaphoretic.   HENT:      Head: Normocephalic and atraumatic.      Right Ear: External ear normal.      Left Ear: External ear normal.      Nose: Nose normal.      Mouth/Throat:      Mouth: Mucous membranes are moist.      Pharynx: Oropharynx is clear.   Eyes:      Extraocular Movements: Extraocular movements intact.      Conjunctiva/sclera: Conjunctivae normal.      Pupils: Pupils are equal, round, and reactive to light.   Neck:      Musculoskeletal: Normal range of motion and neck supple.      Vascular: No JVD.      Trachea: No tracheal deviation.   Cardiovascular:      Rate and Rhythm: Normal rate and regular rhythm.      Heart sounds: Normal heart sounds. No murmur.   Pulmonary:      Effort: Pulmonary effort is normal. No respiratory distress.      Breath sounds: Normal breath sounds. No wheezing.   Chest:      Chest wall: Tenderness ( right lower ribs) present.   Abdominal:      General: Bowel sounds are normal. There is no distension.      Palpations: Abdomen is soft.      Tenderness: There is no abdominal tenderness. There is no guarding or rebound.   Musculoskeletal:         General: No deformity.      Right shoulder: She exhibits decreased range of motion and bony tenderness. She exhibits no swelling, no effusion, no crepitus, no deformity, no laceration and normal pulse.   Skin:     General: Skin is warm and dry.      Coloration: Skin is not pale.      Findings: No erythema or rash.   Neurological:      Mental  Status: She is alert and oriented to person, place, and time.      Cranial Nerves: No cranial nerve deficit.   Psychiatric:         Behavior: Behavior normal.         Thought Content: Thought content normal.         Procedures           ED Course  ED Course as of Feb 10 1827   Wed Feb 10, 2021   1613 IMPRESSION:     1. Few groundglass nodules are present. This is nonspecific.  2. Fractures of the right eighth through 10th ribs posteriorly.  3. No pneumothorax  4. Small nodule in the left breast           This report was finalized on 2/10/2021 3:54 PM by Dr. Julio Snyder MD.   CT Chest Without Contrast Diagnostic []   1613 IMPRESSION:  No acute bony abnormality         This report was finalized on 2/10/2021 3:15 PM by Dr. Julio Snyder MD.   XR Shoulder 2+ View Right []   1625 Discussed case with Dr. Nicole.  He recommends obtaining a CT of the abdomen given the location of the rib fractures.    []   1809 IMPRESSION:     1. No solid organ injury, hemoperitoneum, or pneumoperitoneum     2. Other incidental findings as discussed above.        3. moderate to large volume stool              This report was finalized on 2/10/2021 6:01 PM by Dr. Julio Snyder MD.   CT Abdomen Pelvis Without Contrast []   1823 Discussed plan of care with patient.  Advised if symptoms worsen including development of shortness of breath or worsening of pain to return to the ED.  Advised to follow-up with PCP in 1 to 2 days.    []      ED Course User Index  [MH] Jasmin Pandey PA-C                                           [facility]    Final diagnoses:   Closed fracture of multiple ribs of right side, initial encounter   Strain of right shoulder, initial encounter            Jasmin Pandey PA-C  02/10/21 1827

## 2021-02-10 NOTE — ED NOTES
Escorted patient to results pending area at this time, instructed patient on split flow process. Patient verbalized understanding, advised to notify staff of any further needs.Verbalized understanding.       Collette, Ashley N, RN  02/10/21 [id number]

## 2021-03-09 ENCOUNTER — APPOINTMENT (OUTPATIENT)
Dept: MAMMOGRAPHY | Facility: HOSPITAL | Age: 61
End: 2021-03-09

## 2021-04-07 ENCOUNTER — HOSPITAL ENCOUNTER (OUTPATIENT)
Dept: MAMMOGRAPHY | Facility: HOSPITAL | Age: 61
Discharge: HOME | End: 2021-04-07
Admitting: NURSE PRACTITIONER

## 2021-04-07 DIAGNOSIS — N63.0 LUMP OR MASS IN BREAST: ICD-10-CM

## 2021-04-07 PROCEDURE — 77061 BREAST TOMOSYNTHESIS UNI: CPT | Performed by: RADIOLOGY

## 2021-04-07 PROCEDURE — 77065 DX MAMMO INCL CAD UNI: CPT | Performed by: RADIOLOGY

## 2021-04-07 PROCEDURE — 77065 DX MAMMO INCL CAD UNI: CPT

## 2021-04-07 PROCEDURE — G0279 TOMOSYNTHESIS, MAMMO: HCPCS

## 2021-06-01 ENCOUNTER — OFFICE VISIT (OUTPATIENT)
Dept: CARDIOLOGY | Facility: CLINIC | Age: 61
End: 2021-06-01

## 2021-06-01 VITALS
TEMPERATURE: 97.7 F | HEART RATE: 73 BPM | WEIGHT: 190.4 LBS | SYSTOLIC BLOOD PRESSURE: 125 MMHG | DIASTOLIC BLOOD PRESSURE: 71 MMHG | OXYGEN SATURATION: 99 % | HEIGHT: 61 IN | BODY MASS INDEX: 35.95 KG/M2

## 2021-06-01 DIAGNOSIS — E78.2 MIXED HYPERLIPIDEMIA: ICD-10-CM

## 2021-06-01 DIAGNOSIS — R06.02 SHORTNESS OF BREATH: ICD-10-CM

## 2021-06-01 DIAGNOSIS — R07.2 PRECORDIAL PAIN: ICD-10-CM

## 2021-06-01 DIAGNOSIS — I10 ESSENTIAL HYPERTENSION: Primary | ICD-10-CM

## 2021-06-01 PROCEDURE — 99214 OFFICE O/P EST MOD 30 MIN: CPT | Performed by: SPECIALIST

## 2021-06-01 RX ORDER — FLUTICASONE PROPIONATE 50 MCG
2 SPRAY, SUSPENSION (ML) NASAL DAILY
COMMUNITY

## 2021-06-01 RX ORDER — HYDROCHLOROTHIAZIDE 25 MG/1
25 TABLET ORAL DAILY
COMMUNITY

## 2021-06-01 RX ORDER — LOSARTAN POTASSIUM AND HYDROCHLOROTHIAZIDE 25; 100 MG/1; MG/1
1 TABLET ORAL DAILY
COMMUNITY

## 2021-06-01 NOTE — PROGRESS NOTES
Subjective   Follow up, hypertension  Santa Valdovinos is a 60 y.o. female who presents to day for Hypertension (follow up).    CHIEF COMPLIANT  Chief Complaint   Patient presents with   • Hypertension     follow up       Active Problems:  Problem List Items Addressed This Visit        Cardiac and Vasculature    Precordial pain    Essential hypertension - Primary    Relevant Medications    hydroCHLOROthiazide (HYDRODIURIL) 25 MG tablet    losartan-hydrochlorothiazide (HYZAAR) 100-25 MG per tablet    Mixed hyperlipidemia    Relevant Orders    Lipid Panel    Comprehensive Metabolic Panel       Pulmonary and Pneumonias    Shortness of breath          HPI  HPI  She has no further chest pain since since last seen she is continues to have shortness of breath on moderate exertion no edema no palpitations no dizziness  PRIOR MEDS  Current Outpatient Medications on File Prior to Visit   Medication Sig Dispense Refill   • aspirin 81 MG EC tablet Take 81 mg by mouth Daily.     • atorvastatin (LIPITOR) 20 MG tablet Take 40 mg by mouth Daily.     • calcium carbonate-cholecalciferol 500-400 MG-UNIT tablet tablet Take  by mouth Daily.     • cetirizine (zyrTEC) 10 MG tablet Take 10 mg by mouth Daily.     • cholecalciferol (VITAMIN D3) 1.25 MG (54855 UT) capsule Take 50,000 Units by mouth 1 (One) Time Per Week.     • docusate calcium (SURFAK) 240 MG capsule Take 1 capsule by mouth 2 (two) times a day. 60 capsule 0   • fenofibrate micronized (LOFIBRA) 134 MG capsule Take 134 mg by mouth Every Morning Before Breakfast.     • ferrous sulfate 325 (65 FE) MG tablet Take 325 mg by mouth 3 (Three) Times a Week.     • fluticasone (FLONASE) 50 MCG/ACT nasal spray 2 sprays into the nostril(s) as directed by provider Daily.     • hydroCHLOROthiazide (HYDRODIURIL) 25 MG tablet Take 25 mg by mouth Daily.     • HYDROcodone-acetaminophen (NORCO) 5-325 MG per tablet Take 1 tablet by mouth Every 6 (Six) Hours As Needed for Moderate Pain . 6 tablet 0    • ibuprofen (ADVIL,MOTRIN) 800 MG tablet Take 1 tablet by mouth Every 6 (Six) Hours As Needed for Moderate Pain . 30 tablet 0   • Insulin Glargine (BASAGLAR KWIKPEN SC) Inject 30 Units under the skin into the appropriate area as directed Every Night.     • losartan (COZAAR) 50 MG tablet Take 100 mg by mouth Daily.     • losartan-hydrochlorothiazide (HYZAAR) 100-25 MG per tablet Take 1 tablet by mouth Daily.     • metFORMIN XR (GLUCOPHAGE-XR) 500 MG 24 hr tablet Take 1,000 mg by mouth 2 (two) times a day.     • methocarbamol (ROBAXIN) 500 MG tablet Take 500 mg by mouth Daily.     • omeprazole (priLOSEC) 20 MG capsule Take 20 mg by mouth Daily.     • ONE TOUCH ULTRA TEST test strip      • ONETOUCH DELICA LANCETS 33G misc      • glipiZIDE (GLUCOTROL) 5 MG tablet Take 5 mg by mouth 2 (two) times a day before meals.     • metaxalone (SKELAXIN) 800 MG tablet Take 1 tablet by mouth 3 (Three) Times a Day As Needed for Muscle Spasms. 9 tablet 0   • Omega-3 Fatty Acids (FISH OIL) 1000 MG capsule capsule Take 1,000 mg by mouth daily with breakfast.     • ondansetron ODT (ZOFRAN-ODT) 4 MG disintegrating tablet Take 1 tablet by mouth Every 6 (Six) Hours As Needed for Nausea or Vomiting. 10 tablet 0   • pantoprazole (PROTONIX) 40 MG EC tablet Take 1 tablet by mouth Daily. 30 tablet 0   • SUPREP BOWEL PREP solution oral solution Dispense one Kit        Sig: Used as Directed 2 bottle 0     No current facility-administered medications on file prior to visit.       ALLERGIES  Latex    HISTORY  Past Medical History:   Diagnosis Date   • Anemia    • Diabetes mellitus (CMS/HCC)    • Fatigue    • Gallbladder disease    • High cholesterol    • Hypertension    • Migraine    • Sinusitis     CURRENTLY       Social History     Socioeconomic History   • Marital status:      Spouse name: Not on file   • Number of children: Not on file   • Years of education: Not on file   • Highest education level: Not on file   Tobacco Use   • Smoking  "status: Never Smoker   • Smokeless tobacco: Never Used   Substance and Sexual Activity   • Alcohol use: No   • Drug use: No   • Sexual activity: Defer     Birth control/protection: Surgical       Family History   Problem Relation Age of Onset   • Heart disease Mother    • Cancer Father    • Cancer Sister    • Diabetes Sister    • Hypertension Sister    • Diabetes Brother    • Heart disease Brother    • Hypertension Brother    • Cancer Paternal Aunt    • Cancer Paternal Uncle    • Diabetes Maternal Grandmother    • Breast cancer Maternal Aunt    • Breast cancer Maternal Cousin        Review of Systems   HENT: Negative for congestion.    Respiratory: Positive for shortness of breath. Negative for apnea, cough, choking, chest tightness, wheezing and stridor.    Cardiovascular: Negative for chest pain, palpitations and leg swelling.   Gastrointestinal: Negative for abdominal distention.   Neurological: Negative for dizziness.       Objective     VITALS: /71 (BP Location: Left arm, Patient Position: Sitting, Cuff Size: Adult)   Pulse 73   Temp 97.7 °F (36.5 °C) (Infrared)   Ht 154.9 cm (61\")   Wt 86.4 kg (190 lb 6.4 oz)   SpO2 99%   BMI 35.98 kg/m²     LABS:   Lab Results (most recent)     None          IMAGING:   CT Abdomen Pelvis Without Contrast    Result Date: 2/10/2021   1. No solid organ injury, hemoperitoneum, or pneumoperitoneum  2. Other incidental findings as discussed above.   3. moderate to large volume stool     This report was finalized on 2/10/2021 6:01 PM by Dr. Julio Snyder MD.      XR Shoulder 2+ View Right    Result Date: 2/10/2021  No acute bony abnormality   This report was finalized on 2/10/2021 3:15 PM by Dr. Julio Snyder MD.      CT Chest Without Contrast Diagnostic    Result Date: 2/10/2021   1. Few groundglass nodules are present. This is nonspecific. 2. Fractures of the right eighth through 10th ribs posteriorly. 3. No pneumothorax 4. Small nodule in the left breast    This report " was finalized on 2/10/2021 3:54 PM by Dr. Julio Snyder MD.      Mammo Diagnostic Digital Tomosynthesis Left With CAD    Result Date: 4/7/2021  Stable mammographic appearance of the left breast with no findings suspicious for malignancy.   BI-RADS CATEGORY:  2, BENIGN   RECOMMENDATION:  Recommend the patient resume routine annual screening mammography after 10/07/2021.  CAD was utilized.  The standard false-negative rate of mammography is between 10% and 25%. Complex patterns or increased breast density will markedly elevate the false-negative rate of mammography.    The patient was notified of the results and recommendations at the time of her visit.  Additionally, a letter, in lay terminology, with the results of this exam will be mailed to the patient.  This report was finalized on 4/7/2021 2:36 PM by Dr. Palak Molina MD.        EXAM:  Physical Exam  Vitals reviewed.   Constitutional:       Appearance: She is well-developed.   HENT:      Head: Normocephalic and atraumatic.   Eyes:      Pupils: Pupils are equal, round, and reactive to light.   Neck:      Thyroid: No thyromegaly.      Vascular: No JVD.   Cardiovascular:      Rate and Rhythm: Normal rate and regular rhythm.      Heart sounds: Normal heart sounds. No murmur heard.   No friction rub. No gallop.    Pulmonary:      Effort: Pulmonary effort is normal. No respiratory distress.      Breath sounds: Normal breath sounds. No stridor. No wheezing or rales.   Chest:      Chest wall: No tenderness.   Musculoskeletal:         General: No tenderness or deformity.      Cervical back: Neck supple.   Skin:     General: Skin is warm and dry.   Neurological:      Mental Status: She is alert and oriented to person, place, and time.      Cranial Nerves: No cranial nerve deficit.      Coordination: Coordination normal.         Procedure   Procedures       Assessment/Plan     Diagnoses and all orders for this visit:    1. Essential hypertension (Primary)    2.  Precordial pain    3. Mixed hyperlipidemia  -     Lipid Panel; Future  -     Comprehensive Metabolic Panel; Future    4. Shortness of breath    1.  Her blood pressure now is well controlled we will continue with the current management  2.  No chest pain since he was last seen encouraged to exercise  3.  Shortness of breath is stable she has lost 8 pounds she is encouraged to lose more weight  4.  Unfortunately I do not have any labs she said she had labs recently at her primary care physician we will try to get the results to check her lipids    Return in about 6 months (around 12/1/2021).               MEDS ORDERED DURING VISIT:  No orders of the defined types were placed in this encounter.      As always, Shonda Burns APRN  I appreciate very much the opportunity to participate in the cardiovascular care of your patients. Please do not hesitate to call me with any questions with regards to Santa Valdovinos evaluation and management.         This document has been electronically signed by Conrad Choi MD  June 1, 2021 13:15 EDT

## 2021-12-02 ENCOUNTER — OFFICE VISIT (OUTPATIENT)
Dept: CARDIOLOGY | Facility: CLINIC | Age: 61
End: 2021-12-02

## 2021-12-02 VITALS
WEIGHT: 176.6 LBS | DIASTOLIC BLOOD PRESSURE: 70 MMHG | RESPIRATION RATE: 16 BRPM | HEART RATE: 72 BPM | SYSTOLIC BLOOD PRESSURE: 107 MMHG | HEIGHT: 61 IN | TEMPERATURE: 98.4 F | BODY MASS INDEX: 33.34 KG/M2

## 2021-12-02 DIAGNOSIS — R06.02 SHORTNESS OF BREATH: ICD-10-CM

## 2021-12-02 DIAGNOSIS — E11.65 UNCONTROLLED TYPE 2 DIABETES MELLITUS WITH HYPERGLYCEMIA (HCC): ICD-10-CM

## 2021-12-02 DIAGNOSIS — R07.2 PRECORDIAL PAIN: ICD-10-CM

## 2021-12-02 DIAGNOSIS — I10 ESSENTIAL HYPERTENSION: Primary | ICD-10-CM

## 2021-12-02 DIAGNOSIS — E78.2 MIXED HYPERLIPIDEMIA: ICD-10-CM

## 2021-12-02 DIAGNOSIS — G47.33 OSA (OBSTRUCTIVE SLEEP APNEA): ICD-10-CM

## 2021-12-02 PROCEDURE — 93000 ELECTROCARDIOGRAM COMPLETE: CPT | Performed by: SPECIALIST

## 2021-12-02 PROCEDURE — 99214 OFFICE O/P EST MOD 30 MIN: CPT | Performed by: SPECIALIST

## 2021-12-02 RX ORDER — ATORVASTATIN CALCIUM 80 MG/1
80 TABLET, FILM COATED ORAL DAILY
Status: SHIPPED | COMMUNITY
Start: 2021-12-02 | End: 2021-12-02 | Stop reason: SDUPTHER

## 2021-12-02 RX ORDER — ATORVASTATIN CALCIUM 80 MG/1
80 TABLET, FILM COATED ORAL DAILY
Qty: 90 TABLET | Refills: 1 | Status: SHIPPED | OUTPATIENT
Start: 2021-12-02 | End: 2022-05-16

## 2021-12-02 RX ORDER — POLYETHYLENE GLYCOL 3350 17 G/17G
17 POWDER, FOR SOLUTION ORAL DAILY
COMMUNITY

## 2021-12-02 NOTE — PROGRESS NOTES
Subjective   Follow up, hypertension  Santa Valdovinos is a 61 y.o. female who presents to day for Chest Pain (precordial, 6 mos follow), Shortness of Breath (mild exertion), Edema (LE), and Med Management (list provided).    CHIEF COMPLIANT  Chief Complaint   Patient presents with   • Chest Pain     precordial, 6 mos follow   • Shortness of Breath     mild exertion   • Edema     LE   • Med Management     list provided       Active Problems:  Problem List Items Addressed This Visit        Cardiac and Vasculature    Precordial pain    Essential hypertension - Primary    Mixed hyperlipidemia    Relevant Medications    atorvastatin (LIPITOR) 80 MG tablet    Other Relevant Orders    Lipid Panel    Comprehensive Metabolic Panel       Endocrine and Metabolic    Uncontrolled type 2 diabetes mellitus with hyperglycemia (HCC)    Relevant Medications    Dulaglutide (TRULICITY SC)    empagliflozin (Jardiance) 25 MG tablet tablet       Pulmonary and Pneumonias    Shortness of breath       Sleep    GUSTAVO (obstructive sleep apnea)    Relevant Orders    Home Sleep Study          HPI  HPI  She has been doing relatively well she has class II dyspnea on exertion she can climb 2 steps flights of steps NOAC she gets short of breath she has some lower extremity edema at the end of the day after standing for long period she does have also some dyspepsia with occasional indigestion but no actual exertional chest pain or told no palpitations she feels tired all day sleepy and she snores at night  PRIOR MEDS  Current Outpatient Medications on File Prior to Visit   Medication Sig Dispense Refill   • calcium carbonate-cholecalciferol 500-400 MG-UNIT tablet tablet Take  by mouth Daily.     • cetirizine (zyrTEC) 10 MG tablet Take 10 mg by mouth Daily.     • cholecalciferol (VITAMIN D3) 1.25 MG (50731 UT) capsule Take 50,000 Units by mouth 1 (One) Time Per Week.     • Dulaglutide (TRULICITY SC) Inject  under the skin into the appropriate area as  directed 1 (One) Time Per Week.     • empagliflozin (Jardiance) 25 MG tablet tablet Take 25 mg by mouth Daily.     • fenofibrate micronized (LOFIBRA) 134 MG capsule Take 134 mg by mouth Every Morning Before Breakfast.     • ferrous sulfate 325 (65 FE) MG tablet Take 325 mg by mouth 3 (Three) Times a Week.     • fluticasone (FLONASE) 50 MCG/ACT nasal spray 2 sprays into the nostril(s) as directed by provider Daily.     • hydroCHLOROthiazide (HYDRODIURIL) 25 MG tablet Take 25 mg by mouth Daily.     • ibuprofen (ADVIL,MOTRIN) 800 MG tablet Take 1 tablet by mouth Every 6 (Six) Hours As Needed for Moderate Pain . 30 tablet 0   • Insulin Glargine (BASAGLAR KWIKPEN SC) Inject 30 Units under the skin into the appropriate area as directed Every Night.     • losartan (COZAAR) 50 MG tablet Take 100 mg by mouth Daily.     • metFORMIN XR (GLUCOPHAGE-XR) 500 MG 24 hr tablet Take 1,000 mg by mouth 2 (two) times a day.     • methocarbamol (ROBAXIN) 500 MG tablet Take 500 mg by mouth Daily.     • Omega-3 Fatty Acids (FISH OIL) 1000 MG capsule capsule Take 1,000 mg by mouth daily with breakfast.     • omeprazole (priLOSEC) 20 MG capsule Take 20 mg by mouth Daily.     • polyethylene glycol (MiraLax) 17 GM/SCOOP powder Take 17 g by mouth Daily.     • [DISCONTINUED] atorvastatin (LIPITOR) 20 MG tablet Take 40 mg by mouth Daily.     • [DISCONTINUED] atorvastatin (LIPITOR) 80 MG tablet Take 1 tablet by mouth Daily.     • aspirin 81 MG EC tablet Take 81 mg by mouth Daily.     • docusate calcium (SURFAK) 240 MG capsule Take 1 capsule by mouth 2 (two) times a day. 60 capsule 0   • glipiZIDE (GLUCOTROL) 5 MG tablet Take 5 mg by mouth 2 (two) times a day before meals.     • HYDROcodone-acetaminophen (NORCO) 5-325 MG per tablet Take 1 tablet by mouth Every 6 (Six) Hours As Needed for Moderate Pain . 6 tablet 0   • losartan-hydrochlorothiazide (HYZAAR) 100-25 MG per tablet Take 1 tablet by mouth Daily.     • metaxalone (SKELAXIN) 800 MG tablet  "Take 1 tablet by mouth 3 (Three) Times a Day As Needed for Muscle Spasms. 9 tablet 0   • ondansetron ODT (ZOFRAN-ODT) 4 MG disintegrating tablet Take 1 tablet by mouth Every 6 (Six) Hours As Needed for Nausea or Vomiting. 10 tablet 0   • ONE TOUCH ULTRA TEST test strip      • ONETOUCH DELICA LANCETS 33G misc      • pantoprazole (PROTONIX) 40 MG EC tablet Take 1 tablet by mouth Daily. 30 tablet 0   • SUPREP BOWEL PREP solution oral solution Dispense one Kit        Sig: Used as Directed 2 bottle 0     No current facility-administered medications on file prior to visit.       ALLERGIES  Latex    HISTORY  Past Medical History:   Diagnosis Date   • Anemia    • Diabetes mellitus (HCC)    • Fatigue    • Gallbladder disease    • High cholesterol    • Hypertension    • Migraine    • Sinusitis     CURRENTLY       Social History     Socioeconomic History   • Marital status:    Tobacco Use   • Smoking status: Never Smoker   • Smokeless tobacco: Never Used   Substance and Sexual Activity   • Alcohol use: No   • Drug use: No   • Sexual activity: Defer     Birth control/protection: Surgical       Family History   Problem Relation Age of Onset   • Heart disease Mother    • Cancer Father    • Cancer Sister    • Diabetes Sister    • Hypertension Sister    • Diabetes Brother    • Heart disease Brother    • Hypertension Brother    • Cancer Paternal Aunt    • Cancer Paternal Uncle    • Diabetes Maternal Grandmother    • Breast cancer Maternal Aunt    • Breast cancer Maternal Cousin        Review of Systems   Respiratory: Positive for shortness of breath. Negative for apnea, cough, choking, chest tightness, wheezing and stridor.    Cardiovascular: Positive for leg swelling. Negative for chest pain and palpitations.       Objective     VITALS: /70   Pulse 72   Temp 98.4 °F (36.9 °C)   Resp 16   Ht 154.9 cm (61\")   Wt 80.1 kg (176 lb 9.6 oz)   BMI 33.37 kg/m²     LABS:   Lab Results (most recent)     None    "       IMAGING:   No Images in the past 120 days found..    EXAM:  Physical Exam  Vitals reviewed.   Constitutional:       Appearance: She is well-developed.   HENT:      Head: Normocephalic and atraumatic.   Eyes:      Pupils: Pupils are equal, round, and reactive to light.   Neck:      Thyroid: No thyromegaly.      Vascular: No JVD.   Cardiovascular:      Rate and Rhythm: Normal rate and regular rhythm.      Heart sounds: Normal heart sounds. No murmur heard.  No friction rub. No gallop.    Pulmonary:      Effort: Pulmonary effort is normal. No respiratory distress.      Breath sounds: Normal breath sounds. No stridor. No wheezing or rales.   Chest:      Chest wall: No tenderness.   Musculoskeletal:         General: No tenderness or deformity.      Cervical back: Neck supple.   Skin:     General: Skin is warm and dry.   Neurological:      Mental Status: She is alert and oriented to person, place, and time.      Cranial Nerves: No cranial nerve deficit.      Coordination: Coordination normal.         Procedure     ECG 12 Lead    Date/Time: 12/2/2021 9:55 AM  Performed by: Conrad Choi MD  Authorized by: Conrad Choi MD           EKG: Normal sinus rhythm otherwise within normal limits comparing with the EKG on 7/22/2019 no significant change       Assessment/Plan     Diagnoses and all orders for this visit:    1. Essential hypertension (Primary)    2. Mixed hyperlipidemia  -     atorvastatin (LIPITOR) 80 MG tablet; Take 1 tablet by mouth Daily.  Dispense: 90 tablet; Refill: 1  -     Lipid Panel; Future  -     Comprehensive Metabolic Panel; Future    3. Precordial pain    4. Uncontrolled type 2 diabetes mellitus with hyperglycemia (HCC)    5. Shortness of breath    6. GUSTAVO (obstructive sleep apnea)  -     Home Sleep Study; Future    Other orders  -     ECG 12 Lead    1.  Her blood pressure is well controlled we will continue the current medications  2.  I have reviewed her labs from her primary care physician with  elevated LDL of 154 she is taking only 20 mg of atorvastatin and going to increase this to 80 mg/day being diabetic the target LDL will be 70 or less  3.  No further chest pain continue current management  4.  She has poorly controlled diabetes with hemoglobin A1c is more than 8  PCP has adjusted her medications will await the results  5.  Her symptoms are consistent with sleep apnea refer her for home sleep study  6.  Her shortness of breath she has lost 14 pounds encouraged to continue losing weight    Return in about 6 months (around 6/2/2022).             MEDS ORDERED DURING VISIT:  New Medications Ordered This Visit   Medications   • atorvastatin (LIPITOR) 80 MG tablet     Sig: Take 1 tablet by mouth Daily.     Dispense:  90 tablet     Refill:  1       As always, Shonda Burns APRN  I appreciate very much the opportunity to participate in the cardiovascular care of your patients. Please do not hesitate to call me with any questions with regards to Santa Valdovinos evaluation and management.         This document has been electronically signed by Conrad Choi MD  December 2, 2021 10:57 EST

## 2022-05-14 DIAGNOSIS — E78.2 MIXED HYPERLIPIDEMIA: ICD-10-CM

## 2022-05-16 ENCOUNTER — OFFICE VISIT (OUTPATIENT)
Dept: ORTHOPEDIC SURGERY | Facility: CLINIC | Age: 62
End: 2022-05-16

## 2022-05-16 VITALS
DIASTOLIC BLOOD PRESSURE: 78 MMHG | HEIGHT: 61 IN | BODY MASS INDEX: 33.34 KG/M2 | SYSTOLIC BLOOD PRESSURE: 151 MMHG | HEART RATE: 65 BPM | WEIGHT: 176.59 LBS

## 2022-05-16 DIAGNOSIS — G56.01 CARPAL TUNNEL SYNDROME, RIGHT: ICD-10-CM

## 2022-05-16 DIAGNOSIS — M79.603 PAIN AND NUMBNESS OF UPPER EXTREMITY: Primary | ICD-10-CM

## 2022-05-16 DIAGNOSIS — R20.0 PAIN AND NUMBNESS OF UPPER EXTREMITY: Primary | ICD-10-CM

## 2022-05-16 PROCEDURE — 99204 OFFICE O/P NEW MOD 45 MIN: CPT | Performed by: ORTHOPAEDIC SURGERY

## 2022-05-16 RX ORDER — ATORVASTATIN CALCIUM 80 MG/1
TABLET, FILM COATED ORAL
Qty: 90 TABLET | Refills: 0 | Status: SHIPPED | OUTPATIENT
Start: 2022-05-16

## 2022-05-16 NOTE — PROGRESS NOTES
New Patient Visit      Patient: Santa Valdovinos  YOB: 1960  Date of Encounter: 2022        Chief Complaint:   Chief Complaint   Patient presents with   • Left Hand - Pain, Numbness, Initial Evaluation   • Right Hand - Pain, Numbness, Initial Evaluation           HPI:   Santa Valdovinos, 61 y.o. female, referred by Shonda Burns APRN presents for evaluation of bilateral hand pain and numbness 2 years duration but recently worsening.  Greatest complaint is numbness of index and middle fingers and she complains of increased pain night which will frequently awaken her.  She i has worn night braces without benefit.  Medical history is remarkable for diabetes non-insulin-dependent.  No history of thyroid disease.  Currently not anticoagulated.  Social history is negative for tobacco.        Active Problem List:  Patient Active Problem List   Diagnosis   • LLQ abdominal pain   • Diarrhea   • Precordial pain   • Shortness of breath   • Essential hypertension   • Mixed hyperlipidemia   • Uncontrolled type 2 diabetes mellitus with hyperglycemia (HCC)   • GUSTAVO (obstructive sleep apnea)           Past Medical History:  Past Medical History:   Diagnosis Date   • Anemia    • Diabetes mellitus (HCC)    • Fatigue    • Gallbladder disease    • High cholesterol    • Hypertension    • Migraine    • Sinusitis     CURRENTLY           Past Surgical History:  Past Surgical History:   Procedure Laterality Date   • ABDOMINAL SURGERY     •  SECTION      times 3   • CHOLECYSTECTOMY N/A 2016    Procedure: CHOLECYSTECTOMY LAPAROSCOPIC;  Surgeon: Anil Resendez MD;  Location: [facility];  Service:    • COLONOSCOPY     • COLONOSCOPY N/A 9/15/2016    Procedure: COLONOSCOPY;  Surgeon: Anil Resendez MD;  Location: [facility];  Service:    • TUBAL ABDOMINAL LIGATION             Family History:  Family History   Problem Relation Age of Onset   • Heart disease Mother    • Cancer Father    • Cancer Sister    •  Diabetes Sister    • Hypertension Sister    • Diabetes Brother    • Heart disease Brother    • Hypertension Brother    • Cancer Paternal Aunt    • Cancer Paternal Uncle    • Diabetes Maternal Grandmother    • Breast cancer Maternal Aunt    • Breast cancer Maternal Cousin          Social History:  Social History     Socioeconomic History   • Marital status:    Tobacco Use   • Smoking status: Never Smoker   • Smokeless tobacco: Never Used   Substance and Sexual Activity   • Alcohol use: No   • Drug use: No   • Sexual activity: Defer     Birth control/protection: Surgical     Body mass index is 33.38 kg/m².      Medications:  Current Outpatient Medications   Medication Sig Dispense Refill   • aspirin 81 MG EC tablet Take 81 mg by mouth Daily.     • cetirizine (zyrTEC) 10 MG tablet Take 10 mg by mouth Daily.     • cholecalciferol (VITAMIN D3) 1.25 MG (69399 UT) capsule Take 50,000 Units by mouth 1 (One) Time Per Week.     • Dulaglutide (TRULICITY SC) Inject  under the skin into the appropriate area as directed 1 (One) Time Per Week.     • empagliflozin (JARDIANCE) 25 MG tablet tablet Take 25 mg by mouth Daily.     • ferrous sulfate 325 (65 FE) MG tablet Take 325 mg by mouth 3 (Three) Times a Week.     • hydroCHLOROthiazide (HYDRODIURIL) 25 MG tablet Take 25 mg by mouth Daily.     • Insulin Glargine (BASAGLAR KWIKPEN SC) Inject 30 Units under the skin into the appropriate area as directed Every Night.     • metFORMIN XR (GLUCOPHAGE-XR) 500 MG 24 hr tablet Take 1,000 mg by mouth 2 (two) times a day.     • methocarbamol (ROBAXIN) 500 MG tablet Take 500 mg by mouth Daily.     • omeprazole (priLOSEC) 20 MG capsule Take 20 mg by mouth Daily.     • ONETOUCH DELICA LANCETS 33G misc      • atorvastatin (LIPITOR) 80 MG tablet Take 1 tablet by mouth once daily 90 tablet 0   • calcium carbonate-cholecalciferol 500-400 MG-UNIT tablet tablet Take  by mouth Daily.     • docusate calcium (SURFAK) 240 MG capsule Take 1 capsule by  mouth 2 (two) times a day. 60 capsule 0   • fenofibrate micronized (LOFIBRA) 134 MG capsule Take 134 mg by mouth Every Morning Before Breakfast.     • fluticasone (FLONASE) 50 MCG/ACT nasal spray 2 sprays into the nostril(s) as directed by provider Daily.     • glipiZIDE (GLUCOTROL) 5 MG tablet Take 5 mg by mouth 2 (two) times a day before meals.     • HYDROcodone-acetaminophen (NORCO) 5-325 MG per tablet Take 1 tablet by mouth Every 6 (Six) Hours As Needed for Moderate Pain . 6 tablet 0   • ibuprofen (ADVIL,MOTRIN) 800 MG tablet Take 1 tablet by mouth Every 6 (Six) Hours As Needed for Moderate Pain . 30 tablet 0   • losartan (COZAAR) 50 MG tablet Take 100 mg by mouth Daily.     • losartan-hydrochlorothiazide (HYZAAR) 100-25 MG per tablet Take 1 tablet by mouth Daily.     • metaxalone (SKELAXIN) 800 MG tablet Take 1 tablet by mouth 3 (Three) Times a Day As Needed for Muscle Spasms. 9 tablet 0   • Omega-3 Fatty Acids (FISH OIL) 1000 MG capsule capsule Take 1,000 mg by mouth daily with breakfast.     • ondansetron ODT (ZOFRAN-ODT) 4 MG disintegrating tablet Take 1 tablet by mouth Every 6 (Six) Hours As Needed for Nausea or Vomiting. 10 tablet 0   • ONE TOUCH ULTRA TEST test strip      • pantoprazole (PROTONIX) 40 MG EC tablet Take 1 tablet by mouth Daily. 30 tablet 0   • polyethylene glycol (MIRALAX) 17 GM/SCOOP powder Take 17 g by mouth Daily.     • SUPREP BOWEL PREP solution oral solution Dispense one Kit        Sig: Used as Directed 2 bottle 0     No current facility-administered medications for this visit.         Allergies:  Allergies   Allergen Reactions   • Latex Rash         Review of Systems:   Review of Systems   HENT: Negative.    Eyes: Negative.    Respiratory: Negative.    Cardiovascular: Positive for leg swelling.   Gastrointestinal: Negative.    Endocrine: Negative.    Genitourinary: Negative.    Musculoskeletal: Positive for arthralgias and back pain.   Skin: Negative.    Allergic/Immunologic:  "Negative.    Neurological: Positive for weakness and numbness.   Hematological: Negative.    Psychiatric/Behavioral: Negative.          Physical Exam:   Physical Exam  GENERAL: 61 y.o. female, alert and oriented X 3 in no acute distress.   Visit Vitals  /78   Pulse 65   Ht 154.9 cm (60.98\")   Wt 80.1 kg (176 lb 9.4 oz)   BMI 33.38 kg/m²         Musculoskeletal:   Examination bilateral hands reveals no thenar atrophy diminished sensation thumb index and middle fingers laterally right greater than left Phalen's sign moderately positive right mild left.      Assessment & Plan:   61 y.o. female presents with history and clinical findings consistent with carpal tunnel syndrome right greater than left.  She is referred for nerve conduction studies bilateral upper extremities and will return once completed.        ICD-10-CM ICD-9-CM   1. Pain and numbness of upper extremity  M79.603 729.5    R20.0 782.0   2. Carpal tunnel syndrome, right  G56.01 354.0             Cc:   Shonda Burns APRN                This document has been electronically signed by Issac Walker MD   May 18, 2022 17:29 EDT      "

## 2023-08-05 ENCOUNTER — APPOINTMENT (OUTPATIENT)
Dept: GENERAL RADIOLOGY | Facility: HOSPITAL | Age: 63
End: 2023-08-05
Payer: COMMERCIAL

## 2023-08-05 ENCOUNTER — APPOINTMENT (OUTPATIENT)
Dept: CT IMAGING | Facility: HOSPITAL | Age: 63
End: 2023-08-05
Payer: COMMERCIAL

## 2023-08-05 ENCOUNTER — HOSPITAL ENCOUNTER (EMERGENCY)
Facility: HOSPITAL | Age: 63
Discharge: HOME | End: 2023-08-05
Attending: STUDENT IN AN ORGANIZED HEALTH CARE EDUCATION/TRAINING PROGRAM
Payer: COMMERCIAL

## 2023-08-05 VITALS
HEIGHT: 61 IN | TEMPERATURE: 98.3 F | DIASTOLIC BLOOD PRESSURE: 79 MMHG | OXYGEN SATURATION: 99 % | RESPIRATION RATE: 16 BRPM | BODY MASS INDEX: 33.23 KG/M2 | SYSTOLIC BLOOD PRESSURE: 145 MMHG | WEIGHT: 176 LBS | HEART RATE: 76 BPM

## 2023-08-05 DIAGNOSIS — S09.90XA INJURY OF HEAD, INITIAL ENCOUNTER: ICD-10-CM
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
## 2023-08-05 DIAGNOSIS — S52.302A CLOSED FRACTURE OF SHAFT OF LEFT RADIUS, UNSPECIFIED FRACTURE MORPHOLOGY, INITIAL ENCOUNTER: ICD-10-CM

## 2023-08-05 DIAGNOSIS — S22.41XA CLOSED FRACTURE OF MULTIPLE RIBS OF RIGHT SIDE, INITIAL ENCOUNTER: ICD-10-CM

## 2023-08-05 DIAGNOSIS — S00.03XA HEMATOMA OF SCALP, INITIAL ENCOUNTER: ICD-10-CM

## 2023-08-05 LAB
ABO GROUP BLD: NORMAL
ABO GROUP BLD: NORMAL
ALBUMIN SERPL-MCNC: 4.2 G/DL (ref 3.5–5.2)
ALBUMIN/GLOB SERPL: 1.4 G/DL
ALP SERPL-CCNC: 77 U/L (ref 39–117)
ALT SERPL W P-5'-P-CCNC: 92 U/L (ref 1–33)
AMYLASE SERPL-CCNC: 44 U/L (ref 28–100)
ANION GAP SERPL CALCULATED.3IONS-SCNC: 12.7 MMOL/L (ref 5–15)
AST SERPL-CCNC: 216 U/L (ref 1–32)
BASOPHILS # BLD AUTO: 0.05 10*3/MM3 (ref 0–0.2)
BASOPHILS NFR BLD AUTO: 0.4 % (ref 0–1.5)
BILIRUB SERPL-MCNC: 0.2 MG/DL (ref 0–1.2)
BLD GP AB SCN SERPL QL: NEGATIVE
BUN SERPL-MCNC: 28 MG/DL (ref 8–23)
BUN/CREAT SERPL: 21.2 (ref 7–25)
CALCIUM SPEC-SCNC: 9.4 MG/DL (ref 8.6–10.5)
CHLORIDE SERPL-SCNC: 107 MMOL/L (ref 98–107)
CO2 SERPL-SCNC: 21.3 MMOL/L (ref 22–29)
CREAT SERPL-MCNC: 1.32 MG/DL (ref 0.57–1)
DEPRECATED RDW RBC AUTO: 41.1 FL (ref 37–54)
EGFRCR SERPLBLD CKD-EPI 2021: 46 ML/MIN/1.73
EOSINOPHIL # BLD AUTO: 0.25 10*3/MM3 (ref 0–0.4)
EOSINOPHIL NFR BLD AUTO: 1.8 % (ref 0.3–6.2)
ERYTHROCYTE [DISTWIDTH] IN BLOOD BY AUTOMATED COUNT: 13.5 % (ref 12.3–15.4)
ETHANOL BLD-MCNC: <10 MG/DL (ref 0–10)
ETHANOL UR QL: <0.01 %
GLOBULIN UR ELPH-MCNC: 2.9 GM/DL
GLUCOSE SERPL-MCNC: 178 MG/DL (ref 65–99)
HCT VFR BLD AUTO: 31.3 % (ref 34–46.6)
HGB BLD-MCNC: 9.8 G/DL (ref 12–15.9)
HOLD SPECIMEN: NORMAL
HOLD SPECIMEN: NORMAL
IMM GRANULOCYTES # BLD AUTO: 0.22 10*3/MM3 (ref 0–0.05)
IMM GRANULOCYTES NFR BLD AUTO: 1.6 % (ref 0–0.5)
INR PPP: 0.93 (ref 0.9–1.1)
LIPASE SERPL-CCNC: 79 U/L (ref 13–60)
LYMPHOCYTES # BLD AUTO: 1.61 10*3/MM3 (ref 0.7–3.1)
LYMPHOCYTES NFR BLD AUTO: 11.8 % (ref 19.6–45.3)
MCH RBC QN AUTO: 26.2 PG (ref 26.6–33)
MCHC RBC AUTO-ENTMCNC: 31.3 G/DL (ref 31.5–35.7)
MCV RBC AUTO: 83.7 FL (ref 79–97)
MONOCYTES # BLD AUTO: 0.63 10*3/MM3 (ref 0.1–0.9)
MONOCYTES NFR BLD AUTO: 4.6 % (ref 5–12)
NEUTROPHILS NFR BLD AUTO: 10.86 10*3/MM3 (ref 1.7–7)
NEUTROPHILS NFR BLD AUTO: 79.8 % (ref 42.7–76)
NRBC BLD AUTO-RTO: 0 /100 WBC (ref 0–0.2)
PLATELET # BLD AUTO: 323 10*3/MM3 (ref 140–450)
PMV BLD AUTO: 8.8 FL (ref 6–12)
POTASSIUM SERPL-SCNC: 3.2 MMOL/L (ref 3.5–5.2)
PROT SERPL-MCNC: 7.1 G/DL (ref 6–8.5)
PROTHROMBIN TIME: 12.9 SECONDS (ref 12.1–14.7)
RBC # BLD AUTO: 3.74 10*6/MM3 (ref 3.77–5.28)
RH BLD: POSITIVE
RH BLD: POSITIVE
SODIUM SERPL-SCNC: 141 MMOL/L (ref 136–145)
T&S EXPIRATION DATE: NORMAL
WBC NRBC COR # BLD: 13.62 10*3/MM3 (ref 3.4–10.8)
WHOLE BLOOD HOLD COAG: NORMAL
WHOLE BLOOD HOLD SPECIMEN: NORMAL

## 2023-08-05 PROCEDURE — 85610 PROTHROMBIN TIME: CPT | Performed by: STUDENT IN AN ORGANIZED HEALTH CARE EDUCATION/TRAINING PROGRAM

## 2023-08-05 PROCEDURE — 72125 CT NECK SPINE W/O DYE: CPT

## 2023-08-05 PROCEDURE — 80053 COMPREHEN METABOLIC PANEL: CPT | Performed by: STUDENT IN AN ORGANIZED HEALTH CARE EDUCATION/TRAINING PROGRAM

## 2023-08-05 PROCEDURE — 74177 CT ABD & PELVIS W/CONTRAST: CPT

## 2023-08-05 PROCEDURE — 25010000002 HYDROMORPHONE 1 MG/ML SOLUTION: Performed by: EMERGENCY MEDICINE

## 2023-08-05 PROCEDURE — 72125 CT NECK SPINE W/O DYE: CPT | Performed by: RADIOLOGY

## 2023-08-05 PROCEDURE — 73090 X-RAY EXAM OF FOREARM: CPT

## 2023-08-05 PROCEDURE — 96374 THER/PROPH/DIAG INJ IV PUSH: CPT

## 2023-08-05 PROCEDURE — 25510000001 IOPAMIDOL PER 1 ML: Performed by: STUDENT IN AN ORGANIZED HEALTH CARE EDUCATION/TRAINING PROGRAM

## 2023-08-05 PROCEDURE — 71275 CT ANGIOGRAPHY CHEST: CPT

## 2023-08-05 PROCEDURE — 73090 X-RAY EXAM OF FOREARM: CPT | Performed by: RADIOLOGY

## 2023-08-05 PROCEDURE — 82077 ASSAY SPEC XCP UR&BREATH IA: CPT | Performed by: STUDENT IN AN ORGANIZED HEALTH CARE EDUCATION/TRAINING PROGRAM

## 2023-08-05 PROCEDURE — 99285 EMERGENCY DEPT VISIT HI MDM: CPT

## 2023-08-05 PROCEDURE — 73110 X-RAY EXAM OF WRIST: CPT

## 2023-08-05 PROCEDURE — 86850 RBC ANTIBODY SCREEN: CPT | Performed by: STUDENT IN AN ORGANIZED HEALTH CARE EDUCATION/TRAINING PROGRAM

## 2023-08-05 PROCEDURE — 86901 BLOOD TYPING SEROLOGIC RH(D): CPT

## 2023-08-05 PROCEDURE — 25010000002 LORAZEPAM PER 2 MG: Performed by: STUDENT IN AN ORGANIZED HEALTH CARE EDUCATION/TRAINING PROGRAM

## 2023-08-05 PROCEDURE — 72128 CT CHEST SPINE W/O DYE: CPT

## 2023-08-05 PROCEDURE — 73110 X-RAY EXAM OF WRIST: CPT | Performed by: RADIOLOGY

## 2023-08-05 PROCEDURE — 96375 TX/PRO/DX INJ NEW DRUG ADDON: CPT

## 2023-08-05 PROCEDURE — 86900 BLOOD TYPING SEROLOGIC ABO: CPT

## 2023-08-05 PROCEDURE — 83690 ASSAY OF LIPASE: CPT | Performed by: STUDENT IN AN ORGANIZED HEALTH CARE EDUCATION/TRAINING PROGRAM

## 2023-08-05 PROCEDURE — 70450 CT HEAD/BRAIN W/O DYE: CPT

## 2023-08-05 PROCEDURE — 85025 COMPLETE CBC W/AUTO DIFF WBC: CPT | Performed by: STUDENT IN AN ORGANIZED HEALTH CARE EDUCATION/TRAINING PROGRAM

## 2023-08-05 PROCEDURE — 70450 CT HEAD/BRAIN W/O DYE: CPT | Performed by: RADIOLOGY

## 2023-08-05 PROCEDURE — 82150 ASSAY OF AMYLASE: CPT | Performed by: STUDENT IN AN ORGANIZED HEALTH CARE EDUCATION/TRAINING PROGRAM

## 2023-08-05 PROCEDURE — 86901 BLOOD TYPING SEROLOGIC RH(D): CPT | Performed by: STUDENT IN AN ORGANIZED HEALTH CARE EDUCATION/TRAINING PROGRAM

## 2023-08-05 PROCEDURE — 72131 CT LUMBAR SPINE W/O DYE: CPT

## 2023-08-05 PROCEDURE — 86900 BLOOD TYPING SEROLOGIC ABO: CPT | Performed by: STUDENT IN AN ORGANIZED HEALTH CARE EDUCATION/TRAINING PROGRAM

## 2023-08-05 PROCEDURE — 25010000002 ONDANSETRON PER 1 MG: Performed by: EMERGENCY MEDICINE

## 2023-08-05 PROCEDURE — 36415 COLL VENOUS BLD VENIPUNCTURE: CPT

## 2023-08-05 RX ORDER — LORAZEPAM 2 MG/ML
0.5 INJECTION INTRAMUSCULAR ONCE
Status: COMPLETED | OUTPATIENT
Start: 2023-08-05 | End: 2023-08-05

## 2023-08-05 RX ORDER — HYDROCODONE BITARTRATE AND ACETAMINOPHEN 5; 325 MG/1; MG/1
1 TABLET ORAL ONCE
Status: DISCONTINUED | OUTPATIENT
Start: 2023-08-05 | End: 2023-08-05

## 2023-08-05 RX ORDER — HYDROCODONE BITARTRATE AND ACETAMINOPHEN 5; 325 MG/1; MG/1
1 TABLET ORAL EVERY 6 HOURS PRN
Status: DISCONTINUED | OUTPATIENT
Start: 2023-08-05 | End: 2023-08-06 | Stop reason: HOSPADM

## 2023-08-05 RX ORDER — ONDANSETRON 2 MG/ML
4 INJECTION INTRAMUSCULAR; INTRAVENOUS ONCE
Status: COMPLETED | OUTPATIENT
Start: 2023-08-05 | End: 2023-08-05

## 2023-08-05 RX ORDER — HYDROCODONE BITARTRATE AND ACETAMINOPHEN 5; 325 MG/1; MG/1
1 TABLET ORAL EVERY 6 HOURS PRN
Qty: 10 TABLET | Refills: 0 | Status: SHIPPED | OUTPATIENT
Start: 2023-08-05

## 2023-08-05 RX ORDER — SODIUM CHLORIDE 0.9 % (FLUSH) 0.9 %
10 SYRINGE (ML) INJECTION AS NEEDED
Status: DISCONTINUED | OUTPATIENT
Start: 2023-08-05 | End: 2023-08-06 | Stop reason: HOSPADM

## 2023-08-05 RX ADMIN — HYDROMORPHONE HYDROCHLORIDE 1 MG: 1 INJECTION, SOLUTION INTRAMUSCULAR; INTRAVENOUS; SUBCUTANEOUS at 19:44

## 2023-08-05 RX ADMIN — IOPAMIDOL 80 ML: 755 INJECTION, SOLUTION INTRAVENOUS at 17:00

## 2023-08-05 RX ADMIN — LORAZEPAM 0.5 MG: 2 INJECTION INTRAMUSCULAR; INTRAVENOUS at 18:19

## 2023-08-05 RX ADMIN — ONDANSETRON HYDROCHLORIDE 4 MG: 2 INJECTION, SOLUTION INTRAMUSCULAR; INTRAVENOUS at 19:43
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
## 2023-08-05 NOTE — Clinical Note
[facility] EMERGENCY DEPARTMENT  [address] [facility] [address]  Phone: [phone]    Santa Valdovinos was seen and treated in our emergency department on 8/5/2023.  She may return to work on 08/21/2023.         Thank you for choosing [facility].    Bacilio De La Rosa MD

## 2023-08-14 ENCOUNTER — APPOINTMENT (OUTPATIENT)
Dept: GENERAL RADIOLOGY | Facility: HOSPITAL | Age: 63
End: 2023-08-14
Payer: COMMERCIAL

## 2023-08-14 ENCOUNTER — ANESTHESIA EVENT (OUTPATIENT)
Dept: PERIOP | Facility: HOSPITAL | Age: 63
End: 2023-08-14
Payer: COMMERCIAL

## 2023-08-14 ENCOUNTER — ANESTHESIA (OUTPATIENT)
Dept: PERIOP | Facility: HOSPITAL | Age: 63
End: 2023-08-14
Payer: COMMERCIAL

## 2023-08-14 ENCOUNTER — HOSPITAL ENCOUNTER (OUTPATIENT)
Facility: HOSPITAL | Age: 63
Setting detail: HOSPITAL OUTPATIENT SURGERY
Discharge: HOME | End: 2023-08-14
Attending: GENERAL PRACTICE | Admitting: GENERAL PRACTICE
Payer: COMMERCIAL

## 2023-08-14 VITALS
SYSTOLIC BLOOD PRESSURE: 142 MMHG | OXYGEN SATURATION: 95 % | TEMPERATURE: 97.6 F | RESPIRATION RATE: 18 BRPM | DIASTOLIC BLOOD PRESSURE: 79 MMHG | HEART RATE: 85 BPM | HEIGHT: 61 IN | BODY MASS INDEX: 34.74 KG/M2 | WEIGHT: 184 LBS

## 2023-08-14 DIAGNOSIS — Z98.890 HISTORY OF SURGERY ON ARM: Primary | ICD-10-CM

## 2023-08-14 LAB — GLUCOSE BLDC GLUCOMTR-MCNC: 129 MG/DL (ref 70–130)

## 2023-08-14 PROCEDURE — C1713 ANCHOR/SCREW BN/BN,TIS/BN: HCPCS | Performed by: GENERAL PRACTICE

## 2023-08-14 PROCEDURE — 76000 FLUOROSCOPY <1 HR PHYS/QHP: CPT

## 2023-08-14 PROCEDURE — 25010000002 FENTANYL CITRATE (PF) 50 MCG/ML SOLUTION: Performed by: ANESTHESIOLOGY

## 2023-08-14 PROCEDURE — 82948 REAGENT STRIP/BLOOD GLUCOSE: CPT

## 2023-08-14 PROCEDURE — 25010000002 CEFAZOLIN PER 500 MG: Performed by: GENERAL PRACTICE

## 2023-08-14 PROCEDURE — 25010000002 PROPOFOL 200 MG/20ML EMULSION: Performed by: NURSE ANESTHETIST, CERTIFIED REGISTERED

## 2023-08-14 PROCEDURE — 25010000002 BUPRENORPHINE PER 0.1 MG: Performed by: ANESTHESIOLOGY

## 2023-08-14 PROCEDURE — 25010000002 FENTANYL CITRATE (PF) 50 MCG/ML SOLUTION: Performed by: NURSE ANESTHETIST, CERTIFIED REGISTERED

## 2023-08-14 PROCEDURE — 25010000002 ROPIVACAINE PER 1 MG: Performed by: ANESTHESIOLOGY

## 2023-08-14 PROCEDURE — 25010000002 DEXAMETHASONE PER 1 MG: Performed by: ANESTHESIOLOGY

## 2023-08-14 PROCEDURE — 25010000002 ONDANSETRON PER 1 MG: Performed by: NURSE ANESTHETIST, CERTIFIED REGISTERED

## 2023-08-14 DEVICE — IMPLANTABLE DEVICE: Type: IMPLANTABLE DEVICE | Site: WRIST | Status: FUNCTIONAL

## 2023-08-14 DEVICE — SCRW CORT FUL/THRD S/TAP HEX/SM 2.7X14MM: Type: IMPLANTABLE DEVICE | Site: WRIST | Status: FUNCTIONAL

## 2023-08-14 DEVICE — SCRW CORT S/TAP HEX/SM 3.5X16MM: Type: IMPLANTABLE DEVICE | Site: WRIST | Status: FUNCTIONAL

## 2023-08-14 DEVICE — BONE WAX
Type: IMPLANTABLE DEVICE | Site: WRIST | Status: FUNCTIONAL
Brand: ETHICON

## 2023-08-14 RX ORDER — KETOROLAC TROMETHAMINE 30 MG/ML
30 INJECTION, SOLUTION INTRAMUSCULAR; INTRAVENOUS EVERY 6 HOURS PRN
Status: DISCONTINUED | OUTPATIENT
Start: 2023-08-14 | End: 2023-08-14 | Stop reason: HOSPADM

## 2023-08-14 RX ORDER — DEXAMETHASONE SODIUM PHOSPHATE 4 MG/ML
INJECTION, SOLUTION INTRA-ARTICULAR; INTRALESIONAL; INTRAMUSCULAR; INTRAVENOUS; SOFT TISSUE
Status: COMPLETED | OUTPATIENT
Start: 2023-08-14 | End: 2023-08-14

## 2023-08-14 RX ORDER — MIDAZOLAM HYDROCHLORIDE 1 MG/ML
1 INJECTION INTRAMUSCULAR; INTRAVENOUS
Status: DISCONTINUED | OUTPATIENT
Start: 2023-08-14 | End: 2023-08-14 | Stop reason: HOSPADM

## 2023-08-14 RX ORDER — SODIUM CHLORIDE 9 MG/ML
40 INJECTION, SOLUTION INTRAVENOUS AS NEEDED
Status: DISCONTINUED | OUTPATIENT
Start: 2023-08-14 | End: 2023-08-14 | Stop reason: HOSPADM

## 2023-08-14 RX ORDER — SODIUM CHLORIDE, SODIUM LACTATE, POTASSIUM CHLORIDE, CALCIUM CHLORIDE 600; 310; 30; 20 MG/100ML; MG/100ML; MG/100ML; MG/100ML
INJECTION, SOLUTION INTRAVENOUS CONTINUOUS PRN
Status: DISCONTINUED | OUTPATIENT
Start: 2023-08-14 | End: 2023-08-14 | Stop reason: SURG

## 2023-08-14 RX ORDER — SODIUM CHLORIDE, SODIUM LACTATE, POTASSIUM CHLORIDE, CALCIUM CHLORIDE 600; 310; 30; 20 MG/100ML; MG/100ML; MG/100ML; MG/100ML
100 INJECTION, SOLUTION INTRAVENOUS ONCE AS NEEDED
Status: DISCONTINUED | OUTPATIENT
Start: 2023-08-14 | End: 2023-08-14 | Stop reason: HOSPADM

## 2023-08-14 RX ORDER — ROPIVACAINE HYDROCHLORIDE 5 MG/ML
INJECTION, SOLUTION EPIDURAL; INFILTRATION; PERINEURAL
Status: COMPLETED | OUTPATIENT
Start: 2023-08-14 | End: 2023-08-14

## 2023-08-14 RX ORDER — SODIUM CHLORIDE, SODIUM LACTATE, POTASSIUM CHLORIDE, CALCIUM CHLORIDE 600; 310; 30; 20 MG/100ML; MG/100ML; MG/100ML; MG/100ML
125 INJECTION, SOLUTION INTRAVENOUS ONCE
Status: COMPLETED | OUTPATIENT
Start: 2023-08-14 | End: 2023-08-14

## 2023-08-14 RX ORDER — CELECOXIB 200 MG/1
200 CAPSULE ORAL DAILY
Qty: 14 CAPSULE | Refills: 0 | Status: SHIPPED | OUTPATIENT
Start: 2023-08-14

## 2023-08-14 RX ORDER — BUPRENORPHINE HYDROCHLORIDE 0.32 MG/ML
INJECTION INTRAMUSCULAR; INTRAVENOUS
Status: COMPLETED | OUTPATIENT
Start: 2023-08-14 | End: 2023-08-14

## 2023-08-14 RX ORDER — OXYCODONE HYDROCHLORIDE AND ACETAMINOPHEN 5; 325 MG/1; MG/1
1 TABLET ORAL ONCE AS NEEDED
Status: COMPLETED | OUTPATIENT
Start: 2023-08-14 | End: 2023-08-14

## 2023-08-14 RX ORDER — EPHEDRINE SULFATE 5 MG/ML
INJECTION INTRAVENOUS AS NEEDED
Status: DISCONTINUED | OUTPATIENT
Start: 2023-08-14 | End: 2023-08-14 | Stop reason: SURG

## 2023-08-14 RX ORDER — SODIUM CHLORIDE 0.9 % (FLUSH) 0.9 %
10 SYRINGE (ML) INJECTION EVERY 12 HOURS SCHEDULED
Status: DISCONTINUED | OUTPATIENT
Start: 2023-08-14 | End: 2023-08-14 | Stop reason: HOSPADM

## 2023-08-14 RX ORDER — IPRATROPIUM BROMIDE AND ALBUTEROL SULFATE 2.5; .5 MG/3ML; MG/3ML
3 SOLUTION RESPIRATORY (INHALATION) ONCE AS NEEDED
Status: DISCONTINUED | OUTPATIENT
Start: 2023-08-14 | End: 2023-08-14 | Stop reason: HOSPADM

## 2023-08-14 RX ORDER — GABAPENTIN 300 MG/1
300 CAPSULE ORAL
Qty: 14 CAPSULE | Refills: 0 | Status: SHIPPED | OUTPATIENT
Start: 2023-08-14

## 2023-08-14 RX ORDER — ONDANSETRON 2 MG/ML
INJECTION INTRAMUSCULAR; INTRAVENOUS AS NEEDED
Status: DISCONTINUED | OUTPATIENT
Start: 2023-08-14 | End: 2023-08-14 | Stop reason: SURG

## 2023-08-14 RX ORDER — SODIUM CHLORIDE 0.9 % (FLUSH) 0.9 %
10 SYRINGE (ML) INJECTION AS NEEDED
Status: DISCONTINUED | OUTPATIENT
Start: 2023-08-14 | End: 2023-08-14 | Stop reason: HOSPADM

## 2023-08-14 RX ORDER — FAMOTIDINE 10 MG/ML
INJECTION, SOLUTION INTRAVENOUS AS NEEDED
Status: DISCONTINUED | OUTPATIENT
Start: 2023-08-14 | End: 2023-08-14 | Stop reason: SURG

## 2023-08-14 RX ORDER — FENTANYL CITRATE 50 UG/ML
50 INJECTION, SOLUTION INTRAMUSCULAR; INTRAVENOUS
Status: DISCONTINUED | OUTPATIENT
Start: 2023-08-14 | End: 2023-08-14 | Stop reason: HOSPADM

## 2023-08-14 RX ORDER — ONDANSETRON 4 MG/1
4 TABLET, FILM COATED ORAL EVERY 8 HOURS PRN
Qty: 20 TABLET | Refills: 0 | Status: SHIPPED | OUTPATIENT
Start: 2023-08-14

## 2023-08-14 RX ORDER — OXYCODONE HYDROCHLORIDE 5 MG/1
5 TABLET ORAL EVERY 6 HOURS PRN
Qty: 30 TABLET | Refills: 0 | Status: SHIPPED | OUTPATIENT
Start: 2023-08-14

## 2023-08-14 RX ORDER — PROPOFOL 10 MG/ML
INJECTION, EMULSION INTRAVENOUS AS NEEDED
Status: DISCONTINUED | OUTPATIENT
Start: 2023-08-14 | End: 2023-08-14 | Stop reason: SURG

## 2023-08-14 RX ORDER — LIDOCAINE HYDROCHLORIDE 20 MG/ML
INJECTION, SOLUTION EPIDURAL; INFILTRATION; INTRACAUDAL; PERINEURAL AS NEEDED
Status: DISCONTINUED | OUTPATIENT
Start: 2023-08-14 | End: 2023-08-14 | Stop reason: SURG

## 2023-08-14 RX ORDER — FENTANYL CITRATE 50 UG/ML
INJECTION, SOLUTION INTRAMUSCULAR; INTRAVENOUS AS NEEDED
Status: DISCONTINUED | OUTPATIENT
Start: 2023-08-14 | End: 2023-08-14 | Stop reason: SURG

## 2023-08-14 RX ORDER — ONDANSETRON 2 MG/ML
4 INJECTION INTRAMUSCULAR; INTRAVENOUS AS NEEDED
Status: DISCONTINUED | OUTPATIENT
Start: 2023-08-14 | End: 2023-08-14 | Stop reason: HOSPADM

## 2023-08-14 RX ORDER — MAGNESIUM HYDROXIDE 1200 MG/15ML
LIQUID ORAL AS NEEDED
Status: DISCONTINUED | OUTPATIENT
Start: 2023-08-14 | End: 2023-08-14 | Stop reason: HOSPADM

## 2023-08-14 RX ADMIN — SODIUM CHLORIDE, POTASSIUM CHLORIDE, SODIUM LACTATE AND CALCIUM CHLORIDE 125 ML/HR: 600; 310; 30; 20 INJECTION, SOLUTION INTRAVENOUS at 09:39

## 2023-08-14 RX ADMIN — ROPIVACAINE HYDROCHLORIDE 150 MG: 5 INJECTION, SOLUTION EPIDURAL; INFILTRATION; PERINEURAL at 09:55

## 2023-08-14 RX ADMIN — BUPRENORPHINE HYDROCHLORIDE 0.3 MG: 0.32 INJECTION INTRAMUSCULAR; INTRAVENOUS at 09:55

## 2023-08-14 RX ADMIN — OXYCODONE HYDROCHLORIDE AND ACETAMINOPHEN 1 TABLET: 5; 325 TABLET ORAL at 12:00

## 2023-08-14 RX ADMIN — FENTANYL CITRATE 50 MCG: 50 INJECTION, SOLUTION INTRAMUSCULAR; INTRAVENOUS at 11:46

## 2023-08-14 RX ADMIN — PROPOFOL 150 MG: 10 INJECTION, EMULSION INTRAVENOUS at 10:22

## 2023-08-14 RX ADMIN — FENTANYL CITRATE 50 MCG: 50 INJECTION, SOLUTION INTRAMUSCULAR; INTRAVENOUS at 09:55

## 2023-08-14 RX ADMIN — ONDANSETRON 4 MG: 2 INJECTION INTRAMUSCULAR; INTRAVENOUS at 10:22

## 2023-08-14 RX ADMIN — CEFAZOLIN 2000 MG: 2 INJECTION, POWDER, FOR SOLUTION INTRAMUSCULAR; INTRAVENOUS at 10:18

## 2023-08-14 RX ADMIN — EPHEDRINE SULFATE 10 MG: 5 INJECTION INTRAVENOUS at 10:30

## 2023-08-14 RX ADMIN — SODIUM CHLORIDE, POTASSIUM CHLORIDE, SODIUM LACTATE AND CALCIUM CHLORIDE: 600; 310; 30; 20 INJECTION, SOLUTION INTRAVENOUS at 10:18

## 2023-08-14 RX ADMIN — FENTANYL CITRATE 50 MCG: 50 INJECTION, SOLUTION INTRAMUSCULAR; INTRAVENOUS at 10:58

## 2023-08-14 RX ADMIN — LIDOCAINE HYDROCHLORIDE 60 MG: 20 INJECTION, SOLUTION EPIDURAL; INFILTRATION; INTRACAUDAL; PERINEURAL at 10:22

## 2023-08-14 RX ADMIN — DEXAMETHASONE SODIUM PHOSPHATE 8 MG: 4 INJECTION, SOLUTION INTRA-ARTICULAR; INTRALESIONAL; INTRAMUSCULAR; INTRAVENOUS; SOFT TISSUE at 09:55

## 2023-08-14 RX ADMIN — FAMOTIDINE 20 MG: 10 INJECTION INTRAVENOUS at 10:22

## 2023-08-14 RX ADMIN — EPHEDRINE SULFATE 10 MG: 5 INJECTION INTRAVENOUS at 10:33

## 2023-08-14 RX ADMIN — FENTANYL CITRATE 50 MCG: 50 INJECTION, SOLUTION INTRAMUSCULAR; INTRAVENOUS at 11:08

## 2023-08-14 NOTE — ANESTHESIA PREPROCEDURE EVALUATION
Anesthesia Evaluation     Patient summary reviewed and Nursing notes reviewed   history of anesthetic complications (hx spinal headache):  PONV  NPO Solid Status: > 8 hours  NPO Liquid Status: > 8 hours           Airway   Mallampati: III  TM distance: >3 FB  Neck ROM: full  Possible difficult intubation  Dental    (+) poor dentition and edentulous    Pulmonary - normal exam    breath sounds clear to auscultation  (+) ,shortness of breath, sleep apnea  Cardiovascular - normal exam  Exercise tolerance: poor (<4 METS)    ECG reviewed  Rhythm: regular  Rate: normal    (+) hypertension, valvular problems/murmurs (trace MR and mild TR) MRDOE, hyperlipidemia      Neuro/Psych  (+) headaches  GI/Hepatic/Renal/Endo    (+) obesity, morbid obesity, diabetes mellitus type 2 poorly controlled using insulin    Musculoskeletal     Abdominal  - normal exam  (+) obese   Substance History - negative use     OB/GYN negative ob/gyn ROS         Other   arthritis, blood dyscrasia anemia,     ROS/Med Hx Other: Echo 9/24/2020:  úLeft ventricular ejection fraction appears to be 61 - 65%. Left ventricular systolic function is normal.  úLeft ventricular diastolic function is consistent with (grade II w/high LAP) pseudonormalization.  úTrace mitral valve regurgitation is present.  úMild tricuspid valve regurgitation is present.  úEstimated right ventricular systolic pressure from tricuspid regurgitation is normal (<35 mmHg).           Phys Exam Other: Edent upper and poor lower in situ dent.  + multiple rib fractures with poor respiratory effort due to pain.               Anesthesia Plan    ASA 3     general and general with block     (Left supraclavicular nerve block for post op pain control per surgeon request. )  intravenous induction     Anesthetic plan, risks, benefits, and alternatives have been provided, discussed and informed consent has been obtained with: patient and other.  Pre-procedure education provided  Plan discussed with  CRNA.    CODE STATUS:
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
## 2023-08-14 NOTE — ANESTHESIA POSTPROCEDURE EVALUATION
Patient: Santa Valdovinos    Procedure Summary       Date: 08/14/23 Room / Location:  [facility] OR 03 /  [facility]    Anesthesia Start: 1018 Anesthesia Stop: 1121    Procedure: LEFT RADIUS FRACTURE REPAIR (Left: Wrist) Diagnosis: (S52.332A)    Surgeons: Frank Diaz MD Provider: Gregory Rivas DO    Anesthesia Type: general, general with block ASA Status: 3            Anesthesia Type: general, general with block    Vitals  Vitals Value Taken Time   /69 08/14/23 1152   Temp 97.4 øF (36.3 øC) 08/14/23 1122   Pulse 88 08/14/23 1152   Resp 13 08/14/23 1152   SpO2 93 % 08/14/23 1152           Post Anesthesia Care and Evaluation    Patient location during evaluation: PHASE II  Patient participation: complete - patient participated  Level of consciousness: awake and alert  Pain score: 1  Pain management: adequate    Airway patency: patent  Anesthetic complications: No anesthetic complications  PONV Status: controlled  Cardiovascular status: acceptable  Respiratory status: acceptable and room air  Hydration status: euvolemic  Post Neuraxial Block status: No signs or symptoms of PDPHNo anesthesia care post op

## 2023-08-14 NOTE — ANESTHESIA PROCEDURE NOTES
Peripheral Block    Pre-sedation assessment completed: 8/14/2023 9:54 AM    Patient reassessed immediately prior to procedure    Patient location during procedure: pre-op  Start time: 8/14/2023 9:55 AM  Stop time: 8/14/2023 9:55 AM  Reason for block: procedure for pain, at surgeon's request and post-op pain management  Performed by  Anesthesiologist: Gregory Rivas DO  Preanesthetic Checklist  Completed: patient identified, IV checked, site marked, risks and benefits discussed, surgical consent, monitors and equipment checked, pre-op evaluation and timeout performed  Prep:  Sterile barriers:gloves and mask  Prep: alcohol swabs and ChloraPrep  Patient monitoring: blood pressure monitoring, continuous pulse oximetry and EKG  Procedure    Sedation: yes    Guidance:nerve stimulator and landmark technique  Images:still images not obtained  Loss of twitch: 0.5 mA  Laterality:left  Block Type:supraclavicular  Injection Technique:single-shot  Needle Type:short-bevel and echogenic  Needle Gauge:22 G  Resistance on Injection: none    Medications Used: buprenorphine (BUPRENEX) injection - Injection   0.3 mg - 8/14/2023 9:55:00 AM  dexamethasone (DECADRON) injection - Injection   8 mg - 8/14/2023 9:55:00 AM  ropivacaine (NAROPIN) injection 0.5 % - Peripheral Nerve   150 mg - 8/14/2023 9:55:00 AM      Post Assessment  Injection Assessment: negative aspiration for heme, no paresthesia on injection and incremental injection  Patient Tolerance:comfortable throughout block  Complications:no

## 2023-08-14 NOTE — BRIEF OP NOTE
WRIST OPEN REDUCTION INTERNAL FIXATION  Progress Note    Santa Valdovinos  8/14/2023    Pre-op Diagnosis:   S52.332A       Post-Op Diagnosis Codes:  Left radial shaft fracture  No DRUJ injury    Procedure/CPTr Codes:  1.  Left radial shaft fracture repair, CPT code 35062  2.  Intraoperative use of fluoroscopy, CPT code 34502      Procedure(s):  LEFT RADIUS FRACTURE REPAIR              Surgeon(s):  Frank Diaz MD    Anesthesia: General with Block    Staff:   Circulator: Jia Pandey RN  Radiology Technologist: Bennett Mccoy, RT  Scrub Person: Bronwyn Castaneda Carrie Ann         Estimated Blood Loss: none    Urine Voided: * No values recorded between 8/14/2023 10:18 AM and 8/14/2023 11:21 AM *    Specimens:                None          Drains: * No LDAs found *    Findings: See operative note        Complications: None apparent       was responsible for performing the following activities: Retraction, Suction, Irrigation, Suturing, Closing, and Placing Dressing and their skilled assistance was necessary for the success of this case.    Frank Diaz MD     Date: 8/14/2023  Time: 11:34 EDT

## 2024-06-27 ENCOUNTER — TRANSCRIBE ORDERS (OUTPATIENT)
Dept: ADMINISTRATIVE | Facility: HOSPITAL | Age: 64
End: 2024-06-27
Payer: COMMERCIAL

## 2024-06-27 DIAGNOSIS — Z12.31 VISIT FOR SCREENING MAMMOGRAM: Primary | ICD-10-CM

## 2024-07-16 ENCOUNTER — HOSPITAL ENCOUNTER (OUTPATIENT)
Dept: MAMMOGRAPHY | Facility: HOSPITAL | Age: 64
Discharge: HOME | End: 2024-07-16
Admitting: NURSE PRACTITIONER
Payer: COMMERCIAL

## 2024-07-16 DIAGNOSIS — Z12.31 VISIT FOR SCREENING MAMMOGRAM: ICD-10-CM

## 2024-07-16 PROCEDURE — 77067 SCR MAMMO BI INCL CAD: CPT

## 2024-07-16 PROCEDURE — 77067 SCR MAMMO BI INCL CAD: CPT | Performed by: RADIOLOGY

## 2024-07-16 PROCEDURE — 77063 BREAST TOMOSYNTHESIS BI: CPT

## 2024-07-16 PROCEDURE — 77063 BREAST TOMOSYNTHESIS BI: CPT | Performed by: RADIOLOGY

## 2024-11-02 ENCOUNTER — APPOINTMENT (OUTPATIENT)
Dept: GENERAL RADIOLOGY | Facility: HOSPITAL | Age: 64
End: 2024-11-02
Payer: COMMERCIAL

## 2024-11-02 ENCOUNTER — HOSPITAL ENCOUNTER (EMERGENCY)
Facility: HOSPITAL | Age: 64
Discharge: HOME | End: 2024-11-02
Attending: STUDENT IN AN ORGANIZED HEALTH CARE EDUCATION/TRAINING PROGRAM | Admitting: STUDENT IN AN ORGANIZED HEALTH CARE EDUCATION/TRAINING PROGRAM
Payer: COMMERCIAL

## 2024-11-02 VITALS
DIASTOLIC BLOOD PRESSURE: 57 MMHG | BODY MASS INDEX: 35.3 KG/M2 | HEART RATE: 78 BPM | TEMPERATURE: 97.8 F | RESPIRATION RATE: 18 BRPM | OXYGEN SATURATION: 97 % | HEIGHT: 61 IN | WEIGHT: 187 LBS | SYSTOLIC BLOOD PRESSURE: 150 MMHG

## 2024-11-02 DIAGNOSIS — K59.00 CONSTIPATION, UNSPECIFIED CONSTIPATION TYPE: Primary | ICD-10-CM

## 2024-11-02 PROCEDURE — 71045 X-RAY EXAM CHEST 1 VIEW: CPT

## 2024-11-02 PROCEDURE — 73030 X-RAY EXAM OF SHOULDER: CPT

## 2024-11-02 PROCEDURE — 71045 X-RAY EXAM CHEST 1 VIEW: CPT | Performed by: RADIOLOGY

## 2024-11-02 PROCEDURE — 99283 EMERGENCY DEPT VISIT LOW MDM: CPT

## 2024-11-02 PROCEDURE — 73030 X-RAY EXAM OF SHOULDER: CPT | Performed by: RADIOLOGY

## 2024-11-02 NOTE — Clinical Note
[facility] EMERGENCY DEPARTMENT  [address] [facility] [address]  Phone: [phone]    Santa Valdovinos was seen and treated in our emergency department on 11/2/2024.  She may return to work on 11/04/2024.         Thank you for choosing [facility].    Elena Rivas PA-C
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
## (undated) DEVICE — UNDERCAST PADDING: Brand: DEROYAL

## (undated) DEVICE — BNDG ELAS ELITE V/CLOSE 3IN 5YD LF STRL

## (undated) DEVICE — PENCL ES MEGADINE EZ/CLEAN BUTN W/HOLSTR 10FT

## (undated) DEVICE — PAD CAST SOF ROL NS 2IN

## (undated) DEVICE — BNDG ELAS CO-FLEX SLF ADHR 4IN5YD LF STRL

## (undated) DEVICE — PK EXTREM UPPR 70

## (undated) DEVICE — HOLDER: Brand: DEROYAL

## (undated) DEVICE — BIT DRL QC SS 2.7X100MM

## (undated) DEVICE — SCRW CORT S/TAP HEX/SM 3.5X14MM
Type: IMPLANTABLE DEVICE | Site: WRIST | Status: NON-FUNCTIONAL
Removed: 2023-08-14

## (undated) DEVICE — SUT MNCRYL PLS ANTIB UD 2/0 CP1 27IN

## (undated) DEVICE — SPLNT ORTHOGLASS 4INX15FT

## (undated) DEVICE — IMPLANTABLE DEVICE
Type: IMPLANTABLE DEVICE | Site: WRIST | Status: NON-FUNCTIONAL
Removed: 2023-08-14

## (undated) DEVICE — BIT DRL QC SS 2.5X110MM

## (undated) DEVICE — STRIP,CLOSURE,WOUND,MEDI-STRIP,1/2X4: Brand: MEDLINE

## (undated) DEVICE — BIT DRL QC SS 2.0X100MM

## (undated) DEVICE — DISPOSABLE TOURNIQUET CUFF SINGLE BLADDER, SINGLE PORT AND QUICK CONNECT CONNECTOR: Brand: COLOR CUFF

## (undated) DEVICE — DRSNG PAD ABD 8X10IN STRL